# Patient Record
Sex: MALE | Race: WHITE | Employment: OTHER | URBAN - METROPOLITAN AREA
[De-identification: names, ages, dates, MRNs, and addresses within clinical notes are randomized per-mention and may not be internally consistent; named-entity substitution may affect disease eponyms.]

---

## 2020-03-07 ENCOUNTER — HOSPITAL ENCOUNTER (OUTPATIENT)
Dept: ULTRASOUND IMAGING | Facility: HOSPITAL | Age: 50
Discharge: HOME | End: 2020-03-07
Attending: FAMILY MEDICINE
Payer: COMMERCIAL

## 2020-03-07 ENCOUNTER — OFFICE VISIT (OUTPATIENT)
Dept: FAMILY MEDICINE CLINIC | Facility: CLINIC | Age: 50
End: 2020-03-07
Payer: COMMERCIAL

## 2020-03-07 ENCOUNTER — LAB ENCOUNTER (OUTPATIENT)
Dept: LAB | Facility: HOSPITAL | Age: 50
End: 2020-03-07
Attending: FAMILY MEDICINE
Payer: COMMERCIAL

## 2020-03-07 VITALS
HEIGHT: 74.5 IN | WEIGHT: 248 LBS | TEMPERATURE: 97 F | SYSTOLIC BLOOD PRESSURE: 150 MMHG | HEART RATE: 84 BPM | RESPIRATION RATE: 16 BRPM | BODY MASS INDEX: 31.49 KG/M2 | DIASTOLIC BLOOD PRESSURE: 100 MMHG

## 2020-03-07 DIAGNOSIS — I10 ESSENTIAL HYPERTENSION: ICD-10-CM

## 2020-03-07 DIAGNOSIS — R94.6 ABNORMAL FINDING ON EXAMINATION OF THYROID GLAND: ICD-10-CM

## 2020-03-07 DIAGNOSIS — Z12.5 SCREENING FOR MALIGNANT NEOPLASM OF PROSTATE: ICD-10-CM

## 2020-03-07 DIAGNOSIS — R73.9 HYPERGLYCEMIA: ICD-10-CM

## 2020-03-07 DIAGNOSIS — R73.9 HYPERGLYCEMIA: Primary | ICD-10-CM

## 2020-03-07 DIAGNOSIS — Z12.31 ENCOUNTER FOR SCREENING MAMMOGRAM FOR MALIGNANT NEOPLASM OF BREAST: ICD-10-CM

## 2020-03-07 DIAGNOSIS — Z00.00 LABORATORY EXAMINATION ORDERED AS PART OF A ROUTINE GENERAL MEDICAL EXAMINATION: ICD-10-CM

## 2020-03-07 DIAGNOSIS — Z13.89 SCREENING FOR GENITOURINARY CONDITION: ICD-10-CM

## 2020-03-07 DIAGNOSIS — Z00.00 PHYSICAL EXAM, ANNUAL: Primary | ICD-10-CM

## 2020-03-07 LAB
ALBUMIN SERPL-MCNC: 4 G/DL (ref 3.4–5)
ALBUMIN/GLOB SERPL: 1 {RATIO} (ref 1–2)
ALP LIVER SERPL-CCNC: 76 U/L (ref 45–117)
ALT SERPL-CCNC: 57 U/L (ref 16–61)
ANION GAP SERPL CALC-SCNC: 5 MMOL/L (ref 0–18)
AST SERPL-CCNC: 24 U/L (ref 15–37)
BASOPHILS # BLD AUTO: 0.04 X10(3) UL (ref 0–0.2)
BASOPHILS NFR BLD AUTO: 0.6 %
BILIRUB SERPL-MCNC: 0.8 MG/DL (ref 0.1–2)
BILIRUB UR QL STRIP.AUTO: NEGATIVE
BUN BLD-MCNC: 16 MG/DL (ref 7–18)
BUN/CREAT SERPL: 16.2 (ref 10–20)
CALCIUM BLD-MCNC: 8.4 MG/DL (ref 8.5–10.1)
CHLORIDE SERPL-SCNC: 104 MMOL/L (ref 98–112)
CHOLEST SMN-MCNC: 174 MG/DL (ref ?–200)
CLARITY UR REFRACT.AUTO: CLEAR
CO2 SERPL-SCNC: 27 MMOL/L (ref 21–32)
COLOR UR AUTO: YELLOW
COMPLEXED PSA SERPL-MCNC: 2.79 NG/ML (ref ?–4)
CREAT BLD-MCNC: 0.99 MG/DL (ref 0.7–1.3)
DEPRECATED RDW RBC AUTO: 42.6 FL (ref 35.1–46.3)
EOSINOPHIL # BLD AUTO: 0.07 X10(3) UL (ref 0–0.7)
EOSINOPHIL NFR BLD AUTO: 1.1 %
ERYTHROCYTE [DISTWIDTH] IN BLOOD BY AUTOMATED COUNT: 12.3 % (ref 11–15)
EST. AVERAGE GLUCOSE BLD GHB EST-MCNC: 111 MG/DL (ref 68–126)
GLOBULIN PLAS-MCNC: 3.9 G/DL (ref 2.8–4.4)
GLUCOSE BLD-MCNC: 102 MG/DL (ref 70–99)
GLUCOSE UR STRIP.AUTO-MCNC: NEGATIVE MG/DL
HBA1C MFR BLD HPLC: 5.5 % (ref ?–5.7)
HCT VFR BLD AUTO: 46.2 % (ref 39–53)
HDLC SERPL-MCNC: 53 MG/DL (ref 40–59)
HGB BLD-MCNC: 15.9 G/DL (ref 13–17.5)
IMM GRANULOCYTES # BLD AUTO: 0.01 X10(3) UL (ref 0–1)
IMM GRANULOCYTES NFR BLD: 0.2 %
KETONES UR STRIP.AUTO-MCNC: NEGATIVE MG/DL
LDLC SERPL CALC-MCNC: 101 MG/DL (ref ?–100)
LEUKOCYTE ESTERASE UR QL STRIP.AUTO: NEGATIVE
LYMPHOCYTES # BLD AUTO: 2.2 X10(3) UL (ref 1–4)
LYMPHOCYTES NFR BLD AUTO: 34.2 %
M PROTEIN MFR SERPL ELPH: 7.9 G/DL (ref 6.4–8.2)
MCH RBC QN AUTO: 32.1 PG (ref 26–34)
MCHC RBC AUTO-ENTMCNC: 34.4 G/DL (ref 31–37)
MCV RBC AUTO: 93.3 FL (ref 80–100)
MONOCYTES # BLD AUTO: 0.51 X10(3) UL (ref 0.1–1)
MONOCYTES NFR BLD AUTO: 7.9 %
NEUTROPHILS # BLD AUTO: 3.61 X10 (3) UL (ref 1.5–7.7)
NEUTROPHILS # BLD AUTO: 3.61 X10(3) UL (ref 1.5–7.7)
NEUTROPHILS NFR BLD AUTO: 56 %
NITRITE UR QL STRIP.AUTO: NEGATIVE
NONHDLC SERPL-MCNC: 121 MG/DL (ref ?–130)
OSMOLALITY SERPL CALC.SUM OF ELEC: 283 MOSM/KG (ref 275–295)
PATIENT FASTING Y/N/NP: YES
PATIENT FASTING Y/N/NP: YES
PH UR STRIP.AUTO: 6 [PH] (ref 4.5–8)
PLATELET # BLD AUTO: 252 10(3)UL (ref 150–450)
POTASSIUM SERPL-SCNC: 3.7 MMOL/L (ref 3.5–5.1)
PROT UR STRIP.AUTO-MCNC: NEGATIVE MG/DL
RBC # BLD AUTO: 4.95 X10(6)UL (ref 4.3–5.7)
RBC UR QL AUTO: NEGATIVE
SODIUM SERPL-SCNC: 136 MMOL/L (ref 136–145)
SP GR UR STRIP.AUTO: 1.01 (ref 1–1.03)
TRIGL SERPL-MCNC: 102 MG/DL (ref 30–149)
TSI SER-ACNC: 0.52 MIU/ML (ref 0.36–3.74)
UROBILINOGEN UR STRIP.AUTO-MCNC: <2 MG/DL
VLDLC SERPL CALC-MCNC: 20 MG/DL (ref 0–30)
WBC # BLD AUTO: 6.4 X10(3) UL (ref 4–11)

## 2020-03-07 PROCEDURE — 90471 IMMUNIZATION ADMIN: CPT | Performed by: FAMILY MEDICINE

## 2020-03-07 PROCEDURE — 36415 COLL VENOUS BLD VENIPUNCTURE: CPT

## 2020-03-07 PROCEDURE — 90715 TDAP VACCINE 7 YRS/> IM: CPT | Performed by: FAMILY MEDICINE

## 2020-03-07 PROCEDURE — 80061 LIPID PANEL: CPT

## 2020-03-07 PROCEDURE — 80053 COMPREHEN METABOLIC PANEL: CPT

## 2020-03-07 PROCEDURE — 85025 COMPLETE CBC W/AUTO DIFF WBC: CPT

## 2020-03-07 PROCEDURE — 83036 HEMOGLOBIN GLYCOSYLATED A1C: CPT

## 2020-03-07 PROCEDURE — 81003 URINALYSIS AUTO W/O SCOPE: CPT

## 2020-03-07 PROCEDURE — 76536 US EXAM OF HEAD AND NECK: CPT | Performed by: FAMILY MEDICINE

## 2020-03-07 PROCEDURE — 99386 PREV VISIT NEW AGE 40-64: CPT | Performed by: FAMILY MEDICINE

## 2020-03-07 PROCEDURE — 84443 ASSAY THYROID STIM HORMONE: CPT

## 2020-03-07 RX ORDER — LISINOPRIL 10 MG/1
10 TABLET ORAL DAILY
Qty: 30 TABLET | Refills: 1 | Status: SHIPPED | OUTPATIENT
Start: 2020-03-07 | End: 2020-05-03

## 2020-03-07 NOTE — PATIENT INSTRUCTIONS
Start lisinopril 10 mg 1 tablet daily in the morning. Monitor blood pressure at home write it down and bring readings for the next visit. Bring Rapid [employer]maker  blood pressure machine also for the next visit. Do blood work.   Call [phone] to schedule ultrasound

## 2020-03-07 NOTE — PROGRESS NOTES
Herbie Duverney is a 53year old male who presents for a complete physical exam.   HPI:   Pt has no complaints. His blood pressure was elevated today willing to start medication. He is trying fiber. No chest pain no shortness of breath no palpitations. yes Children: 2  Exercise: three times per week.   Diet: watches calories closely     REVIEW OF SYSTEMS:   GENERAL: feels well otherwise  SKIN: denies any unusual skin lesions  EYES:denies blurred vision or double vision  HEENT: denies nasal congestion, sin hypertension  Abnormal finding on examination of thyroid gland    Orders Placed This Encounter      CBC W/DIFF      COMP METABOLIC PANEL      LIPID PANEL      TSH W REFLEX TO FREE T4      UA/M with Culture Reflex      PSA (Screening) [E]      TdaP (Adacel,

## 2020-04-13 ENCOUNTER — PATIENT MESSAGE (OUTPATIENT)
Dept: FAMILY MEDICINE CLINIC | Facility: CLINIC | Age: 50
End: 2020-04-13

## 2020-04-13 ENCOUNTER — VIRTUAL PHONE E/M (OUTPATIENT)
Dept: FAMILY MEDICINE CLINIC | Facility: CLINIC | Age: 50
End: 2020-04-13
Payer: COMMERCIAL

## 2020-04-13 DIAGNOSIS — I10 ESSENTIAL HYPERTENSION: Primary | ICD-10-CM

## 2020-04-13 DIAGNOSIS — E04.1 THYROID NODULE: ICD-10-CM

## 2020-04-13 PROCEDURE — 99213 OFFICE O/P EST LOW 20 MIN: CPT | Performed by: FAMILY MEDICINE

## 2020-04-13 NOTE — TELEPHONE ENCOUNTER
From: Sandeep Corley  To: Darwin Lao MD  Sent: 4/13/2020 10:38 AM CDT  Subject: Other    This is my blood pressure monitoring chart for a month. I did not take any medications yet.

## 2020-04-13 NOTE — PROGRESS NOTES
Virtual/Telephone Check-In    [address] [place] [address] verbally consents to a Virtual/Telephone Check-In service on 04/13/20. Patient understands and accepts financial responsibility for any deductible, co-insurance and/or co-pays associated with this service.  Irasema Winters Patient also advised to follow CDC guidelines for self isolation/social distancing and symptomatic treatment as outlined on CDC Patient Guidelines. Aide Miguel MD  The note was dictated using speech recognition software.   Accuracy and grammar i

## 2020-05-03 RX ORDER — LISINOPRIL 10 MG/1
10 TABLET ORAL DAILY
Qty: 30 TABLET | Refills: 0 | Status: SHIPPED | OUTPATIENT
Start: 2020-05-03 | End: 2020-06-02

## 2020-05-28 ENCOUNTER — PATIENT MESSAGE (OUTPATIENT)
Dept: FAMILY MEDICINE CLINIC | Facility: CLINIC | Age: 50
End: 2020-05-28

## 2020-05-28 NOTE — TELEPHONE ENCOUNTER
From: Ferny Parks  To: Valorie Mac MD  Sent: 5/28/2020 1:39 PM CDT  Subject: Prescription Question    Blood pressure chart

## 2020-06-02 ENCOUNTER — VIRTUAL PHONE E/M (OUTPATIENT)
Dept: FAMILY MEDICINE CLINIC | Facility: CLINIC | Age: 50
End: 2020-06-02
Payer: COMMERCIAL

## 2020-06-02 VITALS — SYSTOLIC BLOOD PRESSURE: 118 MMHG | DIASTOLIC BLOOD PRESSURE: 82 MMHG

## 2020-06-02 DIAGNOSIS — I10 ESSENTIAL HYPERTENSION: Primary | ICD-10-CM

## 2020-06-02 PROCEDURE — 99213 OFFICE O/P EST LOW 20 MIN: CPT | Performed by: FAMILY MEDICINE

## 2020-06-02 RX ORDER — LISINOPRIL 10 MG/1
10 TABLET ORAL DAILY
Qty: 90 TABLET | Refills: 1 | Status: SHIPPED | OUTPATIENT
Start: 2020-06-02 | End: 2020-06-07

## 2020-06-02 NOTE — PATIENT INSTRUCTIONS
Continue lisinopril at current dose. Low-salt diet. Stay active. Call [phone] to schedule blood test for beginning of July. Follow-up in office with your blood pressure machine at the beginning of November.

## 2020-06-02 NOTE — PROGRESS NOTES
Virtual/Telephone Check-In    [address] [place] [address] verbally consents to a Virtual/Telephone Check-In service on 06/02/20. Patient understands and accepts financial responsibility for any deductible, co-insurance and/or co-pays associated with this service.  This Patient also advised to follow CDC guidelines for self isolation/social distancing and symptomatic treatment as outlined on CDC Patient Guidelines. Joce Covington MD  The note was dictated using speech recognition software.   Accuracy and grammar in tr

## 2020-06-07 DIAGNOSIS — I10 ESSENTIAL HYPERTENSION: ICD-10-CM

## 2020-06-08 RX ORDER — LISINOPRIL 10 MG/1
10 TABLET ORAL DAILY
Qty: 90 TABLET | Refills: 1 | Status: SHIPPED | OUTPATIENT
Start: 2020-06-08 | End: 2020-06-09

## 2020-06-09 DIAGNOSIS — I10 ESSENTIAL HYPERTENSION: ICD-10-CM

## 2020-06-09 RX ORDER — LISINOPRIL 10 MG/1
10 TABLET ORAL DAILY
Qty: 90 TABLET | Refills: 0 | Status: SHIPPED | OUTPATIENT
Start: 2020-06-09 | End: 2020-09-05

## 2020-09-05 DIAGNOSIS — I10 ESSENTIAL HYPERTENSION: ICD-10-CM

## 2020-09-06 DIAGNOSIS — I10 ESSENTIAL HYPERTENSION: ICD-10-CM

## 2020-09-08 RX ORDER — LISINOPRIL 10 MG/1
10 TABLET ORAL DAILY
Qty: 90 TABLET | Refills: 0 | Status: SHIPPED | OUTPATIENT
Start: 2020-09-08 | End: 2020-12-07

## 2020-09-09 RX ORDER — LISINOPRIL 10 MG/1
TABLET ORAL
Qty: 90 TABLET | Refills: 0 | OUTPATIENT
Start: 2020-09-09

## 2020-12-17 RX ORDER — LISINOPRIL 10 MG/1
TABLET ORAL
Qty: 90 TABLET | Refills: 0 | Status: SHIPPED | OUTPATIENT
Start: 2020-12-17 | End: 2021-02-15

## 2020-12-19 ENCOUNTER — MED REC SCAN ONLY (OUTPATIENT)
Dept: FAMILY MEDICINE CLINIC | Facility: CLINIC | Age: 50
End: 2020-12-19

## 2021-02-15 ENCOUNTER — OFFICE VISIT (OUTPATIENT)
Dept: FAMILY MEDICINE CLINIC | Facility: CLINIC | Age: 51
End: 2021-02-15
Payer: COMMERCIAL

## 2021-02-15 ENCOUNTER — HOSPITAL ENCOUNTER (OUTPATIENT)
Dept: ULTRASOUND IMAGING | Facility: HOSPITAL | Age: 51
Discharge: HOME | End: 2021-02-15
Attending: FAMILY MEDICINE
Payer: COMMERCIAL

## 2021-02-15 ENCOUNTER — LAB ENCOUNTER (OUTPATIENT)
Dept: LAB | Facility: HOSPITAL | Age: 51
End: 2021-02-15
Attending: FAMILY MEDICINE
Payer: COMMERCIAL

## 2021-02-15 VITALS
HEART RATE: 69 BPM | SYSTOLIC BLOOD PRESSURE: 124 MMHG | DIASTOLIC BLOOD PRESSURE: 76 MMHG | BODY MASS INDEX: 32.63 KG/M2 | HEIGHT: 74.5 IN | TEMPERATURE: 99 F | RESPIRATION RATE: 16 BRPM | OXYGEN SATURATION: 98 % | WEIGHT: 257 LBS

## 2021-02-15 DIAGNOSIS — E04.1 THYROID NODULE: ICD-10-CM

## 2021-02-15 DIAGNOSIS — Z12.5 SCREENING FOR MALIGNANT NEOPLASM OF PROSTATE: ICD-10-CM

## 2021-02-15 DIAGNOSIS — Z00.00 LABORATORY EXAMINATION ORDERED AS PART OF A ROUTINE GENERAL MEDICAL EXAMINATION: ICD-10-CM

## 2021-02-15 DIAGNOSIS — Z13.89 SCREENING FOR GENITOURINARY CONDITION: ICD-10-CM

## 2021-02-15 DIAGNOSIS — I10 ESSENTIAL HYPERTENSION: ICD-10-CM

## 2021-02-15 DIAGNOSIS — I10 ESSENTIAL HYPERTENSION: Primary | ICD-10-CM

## 2021-02-15 LAB
ALBUMIN SERPL-MCNC: 3.9 G/DL (ref 3.4–5)
ALBUMIN/GLOB SERPL: 1.1 {RATIO} (ref 1–2)
ALP LIVER SERPL-CCNC: 72 U/L
ALT SERPL-CCNC: 51 U/L
ANION GAP SERPL CALC-SCNC: 7 MMOL/L (ref 0–18)
AST SERPL-CCNC: 19 U/L (ref 15–37)
BASOPHILS # BLD AUTO: 0.03 X10(3) UL (ref 0–0.2)
BASOPHILS NFR BLD AUTO: 0.5 %
BILIRUB SERPL-MCNC: 0.7 MG/DL (ref 0.1–2)
BILIRUB UR QL STRIP.AUTO: NEGATIVE
BUN BLD-MCNC: 15 MG/DL (ref 7–18)
BUN/CREAT SERPL: 19.2 (ref 10–20)
CALCIUM BLD-MCNC: 8.7 MG/DL (ref 8.5–10.1)
CHLORIDE SERPL-SCNC: 106 MMOL/L (ref 98–112)
CHOLEST SMN-MCNC: 176 MG/DL (ref ?–200)
CLARITY UR REFRACT.AUTO: CLEAR
CO2 SERPL-SCNC: 25 MMOL/L (ref 21–32)
COMPLEXED PSA SERPL-MCNC: 3.42 NG/ML (ref ?–4)
CREAT BLD-MCNC: 0.78 MG/DL
DEPRECATED RDW RBC AUTO: 42.5 FL (ref 35.1–46.3)
EOSINOPHIL # BLD AUTO: 0.1 X10(3) UL (ref 0–0.7)
EOSINOPHIL NFR BLD AUTO: 1.6 %
ERYTHROCYTE [DISTWIDTH] IN BLOOD BY AUTOMATED COUNT: 12.3 % (ref 11–15)
GLOBULIN PLAS-MCNC: 3.6 G/DL (ref 2.8–4.4)
GLUCOSE BLD-MCNC: 98 MG/DL (ref 70–99)
GLUCOSE UR STRIP.AUTO-MCNC: NEGATIVE MG/DL
HCT VFR BLD AUTO: 44.2 %
HDLC SERPL-MCNC: 59 MG/DL (ref 40–59)
HGB BLD-MCNC: 15 G/DL
IMM GRANULOCYTES # BLD AUTO: 0.02 X10(3) UL (ref 0–1)
IMM GRANULOCYTES NFR BLD: 0.3 %
KETONES UR STRIP.AUTO-MCNC: NEGATIVE MG/DL
LDLC SERPL CALC-MCNC: 93 MG/DL (ref ?–100)
LEUKOCYTE ESTERASE UR QL STRIP.AUTO: NEGATIVE
LYMPHOCYTES # BLD AUTO: 2.14 X10(3) UL (ref 1–4)
LYMPHOCYTES NFR BLD AUTO: 33.2 %
M PROTEIN MFR SERPL ELPH: 7.5 G/DL (ref 6.4–8.2)
MCH RBC QN AUTO: 31.9 PG (ref 26–34)
MCHC RBC AUTO-ENTMCNC: 33.9 G/DL (ref 31–37)
MCV RBC AUTO: 94 FL
MONOCYTES # BLD AUTO: 0.49 X10(3) UL (ref 0.1–1)
MONOCYTES NFR BLD AUTO: 7.6 %
NEUTROPHILS # BLD AUTO: 3.66 X10 (3) UL (ref 1.5–7.7)
NEUTROPHILS # BLD AUTO: 3.66 X10(3) UL (ref 1.5–7.7)
NEUTROPHILS NFR BLD AUTO: 56.8 %
NITRITE UR QL STRIP.AUTO: NEGATIVE
NONHDLC SERPL-MCNC: 117 MG/DL (ref ?–130)
OSMOLALITY SERPL CALC.SUM OF ELEC: 287 MOSM/KG (ref 275–295)
PATIENT FASTING Y/N/NP: YES
PATIENT FASTING Y/N/NP: YES
PH UR STRIP.AUTO: 8 [PH] (ref 4.5–8)
PLATELET # BLD AUTO: 214 10(3)UL (ref 150–450)
POTASSIUM SERPL-SCNC: 3.6 MMOL/L (ref 3.5–5.1)
PROT UR STRIP.AUTO-MCNC: NEGATIVE MG/DL
RBC # BLD AUTO: 4.7 X10(6)UL
RBC UR QL AUTO: NEGATIVE
SODIUM SERPL-SCNC: 138 MMOL/L (ref 136–145)
SP GR UR STRIP.AUTO: 1.01 (ref 1–1.03)
TRIGL SERPL-MCNC: 122 MG/DL (ref 30–149)
TSI SER-ACNC: 0.59 MIU/ML (ref 0.36–3.74)
UROBILINOGEN UR STRIP.AUTO-MCNC: <2 MG/DL
VLDLC SERPL CALC-MCNC: 24 MG/DL (ref 0–30)
WBC # BLD AUTO: 6.4 X10(3) UL (ref 4–11)

## 2021-02-15 PROCEDURE — 80061 LIPID PANEL: CPT

## 2021-02-15 PROCEDURE — 36415 COLL VENOUS BLD VENIPUNCTURE: CPT

## 2021-02-15 PROCEDURE — 80053 COMPREHEN METABOLIC PANEL: CPT

## 2021-02-15 PROCEDURE — 3074F SYST BP LT 130 MM HG: CPT | Performed by: FAMILY MEDICINE

## 2021-02-15 PROCEDURE — 81003 URINALYSIS AUTO W/O SCOPE: CPT

## 2021-02-15 PROCEDURE — 84443 ASSAY THYROID STIM HORMONE: CPT

## 2021-02-15 PROCEDURE — 99213 OFFICE O/P EST LOW 20 MIN: CPT | Performed by: FAMILY MEDICINE

## 2021-02-15 PROCEDURE — 85025 COMPLETE CBC W/AUTO DIFF WBC: CPT

## 2021-02-15 PROCEDURE — 3078F DIAST BP <80 MM HG: CPT | Performed by: FAMILY MEDICINE

## 2021-02-15 PROCEDURE — 3008F BODY MASS INDEX DOCD: CPT | Performed by: FAMILY MEDICINE

## 2021-02-15 PROCEDURE — 76536 US EXAM OF HEAD AND NECK: CPT | Performed by: FAMILY MEDICINE

## 2021-02-15 RX ORDER — LISINOPRIL 10 MG/1
10 TABLET ORAL DAILY
Qty: 90 TABLET | Refills: 1 | Status: SHIPPED | OUTPATIENT
Start: 2021-02-15 | End: 2021-03-15

## 2021-02-15 NOTE — PROGRESS NOTES
Eli Jaramillo is a 49year old male. cc hypertension, thyroid nodule  HPI:   Patient is for follow-up of hypertension doing well with lisinopril no side effects. Denies any chest pain no shortness of breath no palpitations. Needs refill.   His blood press encounter diagnosis)  Thyroid nodule  Laboratory examination ordered as part of a routine general medical examination  Screening for malignant neoplasm of prostate  Screening for genitourinary condition    Orders Placed This Encounter      PSA (Screening)

## 2021-02-15 NOTE — PATIENT INSTRUCTIONS
Call [phone] to schedule US thyroid. Healthy diet. Stay active. Call [phone] to schedule fasting blood tests.

## 2021-02-26 ENCOUNTER — LAB ENCOUNTER (OUTPATIENT)
Dept: LAB | Facility: HOSPITAL | Age: 51
End: 2021-02-26
Attending: FAMILY MEDICINE
Payer: COMMERCIAL

## 2021-02-26 ENCOUNTER — TELEPHONE (OUTPATIENT)
Dept: FAMILY MEDICINE CLINIC | Facility: CLINIC | Age: 51
End: 2021-02-26

## 2021-02-26 ENCOUNTER — TELEMEDICINE (OUTPATIENT)
Dept: FAMILY MEDICINE CLINIC | Facility: CLINIC | Age: 51
End: 2021-02-26

## 2021-02-26 DIAGNOSIS — R51.9 NEW ONSET HEADACHE: ICD-10-CM

## 2021-02-26 DIAGNOSIS — Z20.822 SUSPECTED COVID-19 VIRUS INFECTION: ICD-10-CM

## 2021-02-26 DIAGNOSIS — R50.9 FEVER, UNSPECIFIED FEVER CAUSE: ICD-10-CM

## 2021-02-26 DIAGNOSIS — R63.0 DECREASED APPETITE: ICD-10-CM

## 2021-02-26 DIAGNOSIS — R35.0 FREQUENT URINATION: ICD-10-CM

## 2021-02-26 DIAGNOSIS — R35.0 FREQUENT URINATION: Primary | ICD-10-CM

## 2021-02-26 DIAGNOSIS — R68.83 CHILLS: ICD-10-CM

## 2021-02-26 LAB
BILIRUB UR QL STRIP.AUTO: NEGATIVE
CLARITY UR REFRACT.AUTO: CLEAR
COLOR UR AUTO: YELLOW
GLUCOSE UR STRIP.AUTO-MCNC: NEGATIVE MG/DL
LEUKOCYTE ESTERASE UR QL STRIP.AUTO: NEGATIVE
NITRITE UR QL STRIP.AUTO: NEGATIVE
PH UR STRIP.AUTO: 6 [PH] (ref 5–8)
PROT UR STRIP.AUTO-MCNC: NEGATIVE MG/DL
RBC UR QL AUTO: NEGATIVE
SARS-COV-2 RNA RESP QL NAA+PROBE: DETECTED
SP GR UR STRIP.AUTO: 1.01 (ref 1–1.03)
UROBILINOGEN UR STRIP.AUTO-MCNC: <2 MG/DL

## 2021-02-26 PROCEDURE — 87086 URINE CULTURE/COLONY COUNT: CPT

## 2021-02-26 PROCEDURE — 99213 OFFICE O/P EST LOW 20 MIN: CPT | Performed by: FAMILY MEDICINE

## 2021-02-26 PROCEDURE — 81001 URINALYSIS AUTO W/SCOPE: CPT

## 2021-02-26 NOTE — TELEPHONE ENCOUNTER
zaki received urgent page from pt at [facility]. Call to pt-reports he has had sl fever since Monday 2/22 100-101 and slight headache. Denies any other symptoms and any known exposure to Covid+or suspected Covid+ persons.  Requesting Covid testing, if Dr manzano

## 2021-02-26 NOTE — PROGRESS NOTES
Subjective   Fever, body aches, chills, decreased appetite, headache  HPI:   Lord Davis verbally consents to a Virtual/Telephone Check-In service on 02/26/21.  Patient understands and accepts financial responsibility for any deductible, co-insurance and Future    Decreased appetite  -     SARS-COV-2 BY PCR ([facility]); Future  -     URINE CULTURE, ROUTINE; Future    New onset headache  -     SARS-COV-2 BY PCR ([facility]INITY); Future  -     URINE CULTURE, ROUTINE; Future     Proceed with COVID-19 testing.   We will

## 2021-02-26 NOTE — TELEPHONE ENCOUNTER
Call back to pt-advised of dr dumont's comment/recommendation. Pt confirms can do appt 2 1115 as  requests. Advised will transfer him to  to set up appt. Patient voices understanding/agrees with plan/no further questions.

## 2021-02-26 NOTE — TELEPHONE ENCOUNTER
Please schedule patient for the virtual visit we can do it at 1115 and schedule the Covid test if appropriate.

## 2021-02-27 NOTE — TELEPHONE ENCOUNTER
Patient COVID-19 test came back positive on February 26, 2021. His symptoms started on Monday, February 22, 2021. Patient had a fever body aches headache decreased appetite. Recommended supportive care.   Patient is to quarantine until Wednesday, March 3

## 2021-03-01 NOTE — TELEPHONE ENCOUNTER
Continue symptomatic care hydration use Tylenol as needed if fever above 101. Otherwise monitor. Will call patient in 2 days. Patient has to be not having fevers 24 hours before being released from quarantine.

## 2021-03-01 NOTE — TELEPHONE ENCOUNTER
Patient under quarantine. Fevers running 100 at the highest. Stomach upset and bloating in the evenings after eating, loose stools x 1 yesterday-not today yet. O2 saturation 94-96%. Denies cough/SOB/CP.

## 2021-03-03 ENCOUNTER — TELEPHONE (OUTPATIENT)
Dept: FAMILY MEDICINE CLINIC | Facility: CLINIC | Age: 51
End: 2021-03-03

## 2021-03-03 ENCOUNTER — APPOINTMENT (OUTPATIENT)
Dept: CT IMAGING | Facility: HOSPITAL | Age: 51
DRG: 177 | End: 2021-03-03
Attending: EMERGENCY MEDICINE
Payer: COMMERCIAL

## 2021-03-03 ENCOUNTER — HOSPITAL ENCOUNTER (INPATIENT)
Facility: HOSPITAL | Age: 51
LOS: 6 days | Discharge: HOME | DRG: 177 | End: 2021-03-09
Attending: EMERGENCY MEDICINE | Admitting: HOSPITALIST
Payer: COMMERCIAL

## 2021-03-03 ENCOUNTER — APPOINTMENT (OUTPATIENT)
Dept: GENERAL RADIOLOGY | Facility: HOSPITAL | Age: 51
DRG: 177 | End: 2021-03-03
Attending: EMERGENCY MEDICINE
Payer: COMMERCIAL

## 2021-03-03 DIAGNOSIS — E87.1 HYPONATREMIA: ICD-10-CM

## 2021-03-03 DIAGNOSIS — R09.02 HYPOXIA: ICD-10-CM

## 2021-03-03 DIAGNOSIS — J12.82 PNEUMONIA DUE TO COVID-19 VIRUS: Primary | ICD-10-CM

## 2021-03-03 DIAGNOSIS — U07.1 PNEUMONIA DUE TO COVID-19 VIRUS: Primary | ICD-10-CM

## 2021-03-03 LAB
ALBUMIN SERPL-MCNC: 2.9 G/DL (ref 3.4–5)
ALBUMIN/GLOB SERPL: 0.8 {RATIO} (ref 1–2)
ALP LIVER SERPL-CCNC: 66 U/L
ALT SERPL-CCNC: 61 U/L
ANION GAP SERPL CALC-SCNC: 8 MMOL/L (ref 0–18)
AST SERPL-CCNC: 46 U/L (ref 15–37)
ATRIAL RATE: 102 BPM
BASOPHILS # BLD AUTO: 0 X10(3) UL (ref 0–0.2)
BASOPHILS NFR BLD AUTO: 0 %
BILIRUB SERPL-MCNC: 0.6 MG/DL (ref 0.1–2)
BILIRUB UR QL STRIP.AUTO: NEGATIVE
BUN BLD-MCNC: 8 MG/DL (ref 7–18)
BUN/CREAT SERPL: 9.9 (ref 10–20)
CALCIUM BLD-MCNC: 8.3 MG/DL (ref 8.5–10.1)
CHLORIDE SERPL-SCNC: 96 MMOL/L (ref 98–112)
CK SERPL-CCNC: 166 U/L
CLARITY UR REFRACT.AUTO: CLEAR
CO2 SERPL-SCNC: 24 MMOL/L (ref 21–32)
COLOR UR AUTO: YELLOW
CREAT BLD-MCNC: 0.81 MG/DL
CRP SERPL-MCNC: 3.23 MG/DL (ref ?–0.3)
D-DIMER: 1.91 UG/ML FEU (ref ?–0.5)
DEPRECATED HBV CORE AB SER IA-ACNC: 826.4 NG/ML
DEPRECATED RDW RBC AUTO: 39.4 FL (ref 35.1–46.3)
EOSINOPHIL # BLD AUTO: 0 X10(3) UL (ref 0–0.7)
EOSINOPHIL NFR BLD AUTO: 0 %
ERYTHROCYTE [DISTWIDTH] IN BLOOD BY AUTOMATED COUNT: 12 % (ref 11–15)
GLOBULIN PLAS-MCNC: 3.8 G/DL (ref 2.8–4.4)
GLUCOSE BLD-MCNC: 117 MG/DL (ref 70–99)
GLUCOSE UR STRIP.AUTO-MCNC: 150 MG/DL
HCT VFR BLD AUTO: 39 %
HGB BLD-MCNC: 14.2 G/DL
IMM GRANULOCYTES # BLD AUTO: 0.02 X10(3) UL (ref 0–1)
IMM GRANULOCYTES NFR BLD: 0.3 %
KETONES UR STRIP.AUTO-MCNC: NEGATIVE MG/DL
LDH SERPL L TO P-CCNC: 457 U/L
LEUKOCYTE ESTERASE UR QL STRIP.AUTO: NEGATIVE
LYMPHOCYTES # BLD AUTO: 0.88 X10(3) UL (ref 1–4)
LYMPHOCYTES NFR BLD AUTO: 14.4 %
M PROTEIN MFR SERPL ELPH: 6.7 G/DL (ref 6.4–8.2)
MCH RBC QN AUTO: 32.6 PG (ref 26–34)
MCHC RBC AUTO-ENTMCNC: 36.4 G/DL (ref 31–37)
MCV RBC AUTO: 89.7 FL
MONOCYTES # BLD AUTO: 0.38 X10(3) UL (ref 0.1–1)
MONOCYTES NFR BLD AUTO: 6.2 %
NEUTROPHILS # BLD AUTO: 4.82 X10 (3) UL (ref 1.5–7.7)
NEUTROPHILS # BLD AUTO: 4.82 X10(3) UL (ref 1.5–7.7)
NEUTROPHILS NFR BLD AUTO: 79.1 %
NITRITE UR QL STRIP.AUTO: NEGATIVE
NT-PROBNP SERPL-MCNC: 29 PG/ML (ref ?–125)
OSMOLALITY SERPL CALC.SUM OF ELEC: 265 MOSM/KG (ref 275–295)
P AXIS: 41 DEGREES
P-R INTERVAL: 166 MS
PH UR STRIP.AUTO: 6 [PH] (ref 5–8)
PLATELET # BLD AUTO: 163 10(3)UL (ref 150–450)
POTASSIUM SERPL-SCNC: 3.6 MMOL/L (ref 3.5–5.1)
PROCALCITONIN SERPL-MCNC: <0.05 NG/ML (ref ?–0.16)
PROT UR STRIP.AUTO-MCNC: NEGATIVE MG/DL
Q-T INTERVAL: 344 MS
QRS DURATION: 90 MS
QTC CALCULATION (BEZET): 448 MS
R AXIS: 45 DEGREES
RBC # BLD AUTO: 4.35 X10(6)UL
RBC UR QL AUTO: NEGATIVE
SODIUM SERPL-SCNC: 128 MMOL/L (ref 136–145)
SP GR UR STRIP.AUTO: 1.01 (ref 1–1.03)
T AXIS: 29 DEGREES
TROPONIN I SERPL-MCNC: <0.045 NG/ML (ref ?–0.04)
UROBILINOGEN UR STRIP.AUTO-MCNC: <2 MG/DL
VENTRICULAR RATE: 102 BPM
WBC # BLD AUTO: 6.1 X10(3) UL (ref 4–11)

## 2021-03-03 PROCEDURE — 3E0333Z INTRODUCTION OF ANTI-INFLAMMATORY INTO PERIPHERAL VEIN, PERCUTANEOUS APPROACH: ICD-10-PCS | Performed by: INTERNAL MEDICINE

## 2021-03-03 PROCEDURE — 71045 X-RAY EXAM CHEST 1 VIEW: CPT | Performed by: EMERGENCY MEDICINE

## 2021-03-03 PROCEDURE — 71275 CT ANGIOGRAPHY CHEST: CPT | Performed by: EMERGENCY MEDICINE

## 2021-03-03 PROCEDURE — XW033E5 INTRODUCTION OF REMDESIVIR ANTI-INFECTIVE INTO PERIPHERAL VEIN, PERCUTANEOUS APPROACH, NEW TECHNOLOGY GROUP 5: ICD-10-PCS | Performed by: INTERNAL MEDICINE

## 2021-03-03 PROCEDURE — 99223 1ST HOSP IP/OBS HIGH 75: CPT | Performed by: HOSPITALIST

## 2021-03-03 RX ORDER — ONDANSETRON 2 MG/ML
4 INJECTION INTRAMUSCULAR; INTRAVENOUS EVERY 4 HOURS PRN
Status: CANCELLED | OUTPATIENT
Start: 2021-03-03 | End: 2021-03-03

## 2021-03-03 RX ORDER — SODIUM CHLORIDE 9 MG/ML
1000 INJECTION, SOLUTION INTRAVENOUS ONCE
Status: COMPLETED | OUTPATIENT
Start: 2021-03-03 | End: 2021-03-03

## 2021-03-03 RX ORDER — ZINC SULFATE 50(220)MG
220 CAPSULE ORAL 2 TIMES DAILY
Status: DISCONTINUED | OUTPATIENT
Start: 2021-03-03 | End: 2021-03-09

## 2021-03-03 RX ORDER — ALBUTEROL SULFATE 90 UG/1
4 AEROSOL, METERED RESPIRATORY (INHALATION) EVERY 4 HOURS PRN
Status: DISCONTINUED | OUTPATIENT
Start: 2021-03-03 | End: 2021-03-09

## 2021-03-03 RX ORDER — MULTIVIT,TX WITH IRON,MINERALS
1 TABLET, EXTENDED RELEASE ORAL DAILY
Status: DISCONTINUED | OUTPATIENT
Start: 2021-03-03 | End: 2021-03-03

## 2021-03-03 RX ORDER — LISINOPRIL 10 MG/1
10 TABLET ORAL DAILY
Status: DISCONTINUED | OUTPATIENT
Start: 2021-03-03 | End: 2021-03-09

## 2021-03-03 RX ORDER — DEXAMETHASONE SODIUM PHOSPHATE 10 MG/ML
10 INJECTION, SOLUTION INTRAMUSCULAR; INTRAVENOUS ONCE
Status: COMPLETED | OUTPATIENT
Start: 2021-03-03 | End: 2021-03-03

## 2021-03-03 RX ORDER — MULTIVIT-MIN/IRON/FOLIC ACID/K 18-600-40
2000 CAPSULE ORAL DAILY
COMMUNITY

## 2021-03-03 RX ORDER — GUAIFENESIN 600 MG
600 TABLET, EXTENDED RELEASE 12 HR ORAL 2 TIMES DAILY
Status: DISCONTINUED | OUTPATIENT
Start: 2021-03-03 | End: 2021-03-09

## 2021-03-03 RX ORDER — SODIUM CHLORIDE 9 MG/ML
INJECTION, SOLUTION INTRAVENOUS CONTINUOUS
Status: CANCELLED | OUTPATIENT
Start: 2021-03-03 | End: 2021-03-03

## 2021-03-03 RX ORDER — MULTIVIT,TX WITH IRON,MINERALS
1 TABLET, EXTENDED RELEASE ORAL DAILY
COMMUNITY
End: 2021-11-02 | Stop reason: ALTCHOICE

## 2021-03-03 RX ORDER — ASCORBIC ACID 500 MG
1000 TABLET ORAL DAILY
Status: DISCONTINUED | OUTPATIENT
Start: 2021-03-03 | End: 2021-03-09

## 2021-03-03 RX ORDER — ONDANSETRON 2 MG/ML
4 INJECTION INTRAMUSCULAR; INTRAVENOUS EVERY 6 HOURS PRN
Status: DISCONTINUED | OUTPATIENT
Start: 2021-03-03 | End: 2021-03-09

## 2021-03-03 RX ORDER — ACETAMINOPHEN 325 MG/1
650 TABLET ORAL EVERY 6 HOURS PRN
Status: DISCONTINUED | OUTPATIENT
Start: 2021-03-03 | End: 2021-03-09

## 2021-03-03 RX ORDER — ENOXAPARIN SODIUM 100 MG/ML
40 INJECTION SUBCUTANEOUS DAILY
Status: DISCONTINUED | OUTPATIENT
Start: 2021-03-03 | End: 2021-03-09

## 2021-03-03 NOTE — TELEPHONE ENCOUNTER
Update to below. Pt is admitted to hospital currently. Please advise when to outreach to pt next, orig call was to be friday. Thanks.

## 2021-03-03 NOTE — TELEPHONE ENCOUNTER
See 2/26/21 telephone encounter (condition update)-->patient was instructed to call 911 now-->encounter was routed to Dr. Trae Torres as well.

## 2021-03-03 NOTE — ED NOTES
Nurse to Nurse report called to [facility]. Pt pending going to CT Scan and results prior to going to floor. Pt VSS in NAD at this time will continue to monitor.

## 2021-03-03 NOTE — ED NOTES
Pt O2 sat dropped to 88-89% RA. Pt placed on 2 liters NC O2 sat remained 88-89% increased to 3LNC pt O2 sat increased to 95%. Pt denies CP breathing is non-labored, will continue to monitor.

## 2021-03-03 NOTE — ED INITIAL ASSESSMENT (HPI)
Pt presented to ED c/o SOB worsening over past couple day, last night noted O2 sat 89% and dyspnea on exertion.

## 2021-03-03 NOTE — TELEPHONE ENCOUNTER
(see note from PSR below)    I spoke with patient briefly and he verified his SpO2 has ranged from 89-92% this morning    SOB presently and audibly dyspneic    He was immediately instructed to call 911.  Patient verbalized understanding and stated he will c

## 2021-03-03 NOTE — PLAN OF CARE
NURSING ADMISSION NOTE      Patient admitted via Cart to room 527   Oriented to room. Safety precautions initiated. Bed in low position. Call light in reach. Admission Ba completed. Med rec done. MD paged about new orders.  Pt updated on the plan o patient/family/discharge partner  - Complete POLST form as appropriate  - Assess patient's ability to be responsible for managing their own health  - Refer to Case Management Department for coordinating discharge planning if the patient needs post-hospital

## 2021-03-03 NOTE — ED PROVIDER NOTES
Patient Seen in: [facility] Emergency Department      History   Patient presents with:  Covid  Difficulty Breathing    Stated Complaint: covid positive / short of breath    HPI/Subjective:   HPI    Patient is a pleasant 41-year-old male, Covid posit Extraocular muscles are intact. Pupils are equal and reactive to light. Oropharynx is pink and moist.  NECK: Neck is supple and nontender. The trachea is midline. LUNGS: Lungs are clear to auscultation bilaterally, respirations are unlabored.    HEART: Re CBC WITH DIFFERENTIAL WITH PLATELET    Narrative: The following orders were created for panel order CBC WITH DIFFERENTIAL WITH PLATELET.   Procedure                               Abnormality         Status                     --------- laboratory and radiology studies were reviewed and discussed with the patient. IV Decadron was administered. Patient will be admitted for supportive care and ongoing consideration of treatment options.   Patient will be admitted to the service of the Ed

## 2021-03-03 NOTE — TELEPHONE ENCOUNTER
Agree with 911 and ER evaluation.   Please call patient in couple of hours to make sure that he actually went to the hospital.  Thank you

## 2021-03-03 NOTE — H&P
[facility]  History and Physical     Mallorie Matthews Patient Status:  Emergency    1970 MRN [id number]   Location [address] Attending No att. providers found   [facility] Day # 0 PCP Ruperto Cochran MD     Chief Compl Pertinent positives and negatives noted in the HPI. Physical Exam:    /86   Pulse 105   Temp (!) 101.2 °F (38.4 °C) (Temporal)   Resp 18   Ht 6' 3\" (1.905 m)   Wt 245 lb (111.1 kg)   SpO2 97%   BMI 30.62 kg/m²   General: No acute distress.  Kenia n/a     Plan of care discussed with patient.     Rocael Quintero MD  3/3/2021

## 2021-03-03 NOTE — TELEPHONE ENCOUNTER
1. What are your symptoms? POsitive for COVID short of breath today. Fever 101.1. Oxygen this morning was 89% now 92%    2. How long have you been having these symptoms? 3. Have you done anything already to treat your symptoms?          ADDITION

## 2021-03-04 LAB
ALBUMIN SERPL-MCNC: 3 G/DL (ref 3.4–5)
ALBUMIN/GLOB SERPL: 0.8 {RATIO} (ref 1–2)
ALP LIVER SERPL-CCNC: 69 U/L
ALT SERPL-CCNC: 67 U/L
ANION GAP SERPL CALC-SCNC: 9 MMOL/L (ref 0–18)
ANTIBODY SCREEN: NEGATIVE
AST SERPL-CCNC: 48 U/L (ref 15–37)
BASOPHILS # BLD AUTO: 0.01 X10(3) UL (ref 0–0.2)
BASOPHILS NFR BLD AUTO: 0.1 %
BILIRUB SERPL-MCNC: 0.5 MG/DL (ref 0.1–2)
BUN BLD-MCNC: 11 MG/DL (ref 7–18)
BUN/CREAT SERPL: 14.3 (ref 10–20)
CALCIUM BLD-MCNC: 8.6 MG/DL (ref 8.5–10.1)
CHLORIDE SERPL-SCNC: 98 MMOL/L (ref 98–112)
CHOLEST SMN-MCNC: 121 MG/DL (ref ?–200)
CO2 SERPL-SCNC: 26 MMOL/L (ref 21–32)
CREAT BLD-MCNC: 0.77 MG/DL
CRP SERPL-MCNC: 4.25 MG/DL (ref ?–0.3)
D-DIMER: 1.16 UG/ML FEU (ref ?–0.5)
DEPRECATED HBV CORE AB SER IA-ACNC: 970.5 NG/ML
DEPRECATED RDW RBC AUTO: 42.2 FL (ref 35.1–46.3)
EOSINOPHIL # BLD AUTO: 0 X10(3) UL (ref 0–0.7)
EOSINOPHIL NFR BLD AUTO: 0 %
ERYTHROCYTE [DISTWIDTH] IN BLOOD BY AUTOMATED COUNT: 12.3 % (ref 11–15)
EST. AVERAGE GLUCOSE BLD GHB EST-MCNC: 128 MG/DL (ref 68–126)
GLOBULIN PLAS-MCNC: 4 G/DL (ref 2.8–4.4)
GLUCOSE BLD-MCNC: 112 MG/DL (ref 70–99)
HBA1C MFR BLD HPLC: 6.1 % (ref ?–5.7)
HCT VFR BLD AUTO: 42.1 %
HDLC SERPL-MCNC: 59 MG/DL (ref 40–59)
HGB BLD-MCNC: 14.6 G/DL
IMM GRANULOCYTES # BLD AUTO: 0.04 X10(3) UL (ref 0–1)
IMM GRANULOCYTES NFR BLD: 0.4 %
LDH SERPL L TO P-CCNC: 468 U/L
LDLC SERPL CALC-MCNC: 46 MG/DL (ref ?–100)
LYMPHOCYTES # BLD AUTO: 0.9 X10(3) UL (ref 1–4)
LYMPHOCYTES NFR BLD AUTO: 9.9 %
M PROTEIN MFR SERPL ELPH: 7 G/DL (ref 6.4–8.2)
MCH RBC QN AUTO: 32.1 PG (ref 26–34)
MCHC RBC AUTO-ENTMCNC: 34.7 G/DL (ref 31–37)
MCV RBC AUTO: 92.5 FL
MONOCYTES # BLD AUTO: 0.58 X10(3) UL (ref 0.1–1)
MONOCYTES NFR BLD AUTO: 6.4 %
NEUTROPHILS # BLD AUTO: 7.54 X10 (3) UL (ref 1.5–7.7)
NEUTROPHILS # BLD AUTO: 7.54 X10(3) UL (ref 1.5–7.7)
NEUTROPHILS NFR BLD AUTO: 83.2 %
NONHDLC SERPL-MCNC: 62 MG/DL (ref ?–130)
OSMOLALITY SERPL CALC.SUM OF ELEC: 276 MOSM/KG (ref 275–295)
PLATELET # BLD AUTO: 202 10(3)UL (ref 150–450)
POTASSIUM SERPL-SCNC: 3.9 MMOL/L (ref 3.5–5.1)
RBC # BLD AUTO: 4.55 X10(6)UL
RH BLOOD TYPE: POSITIVE
SODIUM SERPL-SCNC: 133 MMOL/L (ref 136–145)
TRIGL SERPL-MCNC: 80 MG/DL (ref 30–149)
VLDLC SERPL CALC-MCNC: 16 MG/DL (ref 0–30)
WBC # BLD AUTO: 9.1 X10(3) UL (ref 4–11)

## 2021-03-04 PROCEDURE — 99232 SBSQ HOSP IP/OBS MODERATE 35: CPT | Performed by: HOSPITALIST

## 2021-03-04 PROCEDURE — 99255 IP/OBS CONSLTJ NEW/EST HI 80: CPT | Performed by: OTHER

## 2021-03-04 RX ORDER — ASPIRIN 325 MG
325 TABLET, DELAYED RELEASE (ENTERIC COATED) ORAL DAILY
Status: DISCONTINUED | OUTPATIENT
Start: 2021-03-04 | End: 2021-03-09

## 2021-03-04 RX ORDER — DEXAMETHASONE SODIUM PHOSPHATE 4 MG/ML
6 VIAL (ML) INJECTION DAILY
Status: DISCONTINUED | OUTPATIENT
Start: 2021-03-04 | End: 2021-03-07

## 2021-03-04 RX ORDER — CALCIUM CARBONATE 200(500)MG
1000 TABLET,CHEWABLE ORAL 3 TIMES DAILY PRN
Status: DISCONTINUED | OUTPATIENT
Start: 2021-03-04 | End: 2021-03-09

## 2021-03-04 NOTE — PLAN OF CARE
COVID-19 Daily Discharge Readiness-Nursing    Ax04. Telemetry NSR.  on 2L NC, 02 sats 92%. diminished lung sounds. Afebrile. Denied pain. Dry cough. Regular diet. Continent. Urinalysis sent to lab. Ambulatory. Saline lock.  First dose of Remdesivir given DISCHARGE PLANNING  Goal: Discharge to home or other facility with appropriate resources  Description: INTERVENTIONS:  - Identify barriers to discharge w/pt and caregiver  - Include patient/family/discharge partner in discharge planning  - Arrange for need

## 2021-03-04 NOTE — COVID NURSING ASSESSMENT
COVID-19 Daily Discharge Readiness-Nursing    Temperature max from last 24 hrs: Temp (24hrs), Av.1 °F (36.7 °C), Min:97.5 °F (36.4 °C), Max:98.6 °F (37 °C)  Patient is A/Ox4, pt reporting diplopia in L eye for a few days- pt states when he looks to the

## 2021-03-04 NOTE — PAYOR COMM NOTE
--------------  ADMISSION REVIEW     Payor: Serafin Tavera [facility] EXCLUSIVE EPO  Subscriber #:  [id number]  Authorization Number: [id number]    Admit date: 3/3/21  Admit time: 1728       Patient Seen in: [facility] Emergency Department    History   Sanya Aleman Reviewed   COMP METABOLIC PANEL (14) - Abnormal; Notable for the following components:       Result Value    Glucose 117 (*)     Sodium 128 (*)     Chloride 96 (*)     BUN/CREA Ratio 9.9 (*)     Calcium, Total 8.3 (*)     Calculated Osmolality 265 (*) hypoxia    History of Present Illness: Benigno Vaughan is a 49year old male with past medical history significant for hypertension presents to ER with complaint of shortness of breath and hypoxia. Patient first started to have symptoms on 2/22.   He was te needed  2. Dyspnea, hypoxia, due to above  3. Obesity, BMI 30  4.  HTN, BP controlled, resume ACE    Quality:  · DVT Prophylaxis: lovenox  · CODE status: Full        MEDICATIONS ADMINISTERED IN LAST 1 DAY:  ascorbic acid (VITAMIN C) tab 1,000 mg     Date Ac

## 2021-03-04 NOTE — CONSULTS
INFECTIOUS DISEASE CONSULT NOTE    Jyothi Pineda Patient Status:  Inpatient    1970 MRN [id number]   [facility] 5NW-A Attending Dipti Woodruff MD   Hosp Day # 1 PCP Obed Kumari MD       Reason for Consultation:  Covid 19 infect Oral, Daily  •  Enoxaparin Sodium (LOVENOX) 40 MG/0.4ML injection 40 mg, 40 mg, Subcutaneous, Daily  •  acetaminophen (TYLENOL) tab 650 mg, 650 mg, Oral, Q6H PRN  •  ondansetron HCl (ZOFRAN) injection 4 mg, 4 mg, Intravenous, Q6H PRN  •  remdesivir 100 mg 03/03/21  1057 03/04/21  0729   * 112*   BUN 8 11   CREATSERUM 0.81 0.77   GFRAA 120 122   GFRNAA 104 106   CA 8.3* 8.6   ALB 2.9* 3.0*   * 133*   K 3.6 3.9   CL 96* 98   CO2 24.0 26.0   ALKPHO 66 69   AST 46* 48*   ALT 61 67*   BILT 0.6 0.5 in the main or lobar pulmonary arteries. THORACIC AORTA:  No aneurysm or visible dissection. LUNGS:  No visible pulmonary disease. MEDIASTINUM:  Marked ground-glass consolidations throughout the lungs, particularly the lower lobes is noted.  RADHA:  Enlarg Essentially stable exam.  There remains a nodule of the right midpole. This does not meet criteria for biopsy based on RANDY guidelines.     Dictated by (CST): David Leyva MD on 2/15/2021 at 11:12 AM     Finalized by (CST): David Leyva MD on 2/15/2021 at 11:17 with you and will make further recommendations based on his progress.     Shauna Pleitez  3/4/2021  11:40 AM

## 2021-03-04 NOTE — PROGRESS NOTES
[facility]  Progress Note     Lord Davis Patient Status:  Inpatient    1970 MRN [id number]   [facility] 5NW-A Attending Ernie Sanches MD   Hosp Day # 1 PCP Maryann Mclean MD     Chief Complaint: SPN    S: Patient doing 220 mg Oral BID   • cholecalciferol  2,000 Units Oral Daily   • ascorbic acid  1,000 mg Oral Daily   • enoxaparin  40 mg Subcutaneous Daily   • remdesivir  100 mg Intravenous Q24H       ASSESSMENT / PLAN:        1. COVID 19 pna  1.  Inflammatory markers olya

## 2021-03-04 NOTE — CONSULTS
[facility] Neurology Note    Solmon Kasey Patient Status:  Inpatient    1970 MRN [id number]   [facility] 5NW-A Attending Yanet Berry MD   Hosp Day # 1 PCP Valorie Mac MD     REASON FOR CONSULTATION:    Double visi Start Date , End Date , Taking? Yes, Authorizing Provider External/Patient, Reported    Medication lisinopril 10 MG Oral Tab, Sig Take 1 tablet (10 mg total) by mouth daily. , Start Date 2/15/21, End Date , Taking?  Yes, Authorizing Provider Joe Montoya full, Pupils equal and reactive, 3 mm brisk  CN III, IV, VI: EOM with intermittent left 6th nerve palsy, no nystagmus  CN V: Facial sensation normal    CN VII: Symmetric facial movement   CN VIII: Normal hearing   CN IX, XI: uvula and palate elevate symmet

## 2021-03-05 ENCOUNTER — APPOINTMENT (OUTPATIENT)
Dept: MRI IMAGING | Facility: HOSPITAL | Age: 51
DRG: 177 | End: 2021-03-05
Attending: NURSE PRACTITIONER
Payer: COMMERCIAL

## 2021-03-05 LAB
ALBUMIN SERPL-MCNC: 2.8 G/DL (ref 3.4–5)
ALBUMIN/GLOB SERPL: 0.7 {RATIO} (ref 1–2)
ALP LIVER SERPL-CCNC: 67 U/L
ALT SERPL-CCNC: 85 U/L
ANION GAP SERPL CALC-SCNC: 8 MMOL/L (ref 0–18)
AST SERPL-CCNC: 62 U/L (ref 15–37)
BASOPHILS # BLD AUTO: 0.01 X10(3) UL (ref 0–0.2)
BASOPHILS NFR BLD AUTO: 0.2 %
BILIRUB SERPL-MCNC: 0.4 MG/DL (ref 0.1–2)
BUN BLD-MCNC: 14 MG/DL (ref 7–18)
BUN/CREAT SERPL: 17.9 (ref 10–20)
CALCIUM BLD-MCNC: 8.4 MG/DL (ref 8.5–10.1)
CHLORIDE SERPL-SCNC: 101 MMOL/L (ref 98–112)
CO2 SERPL-SCNC: 26 MMOL/L (ref 21–32)
CREAT BLD-MCNC: 0.78 MG/DL
CRP SERPL-MCNC: 2.76 MG/DL (ref ?–0.3)
D-DIMER: 1.27 UG/ML FEU (ref ?–0.5)
DEPRECATED HBV CORE AB SER IA-ACNC: 999.6 NG/ML
DEPRECATED RDW RBC AUTO: 41.3 FL (ref 35.1–46.3)
EOSINOPHIL # BLD AUTO: 0 X10(3) UL (ref 0–0.7)
EOSINOPHIL NFR BLD AUTO: 0 %
ERYTHROCYTE [DISTWIDTH] IN BLOOD BY AUTOMATED COUNT: 12.3 % (ref 11–15)
GLOBULIN PLAS-MCNC: 3.9 G/DL (ref 2.8–4.4)
GLUCOSE BLD-MCNC: 114 MG/DL (ref 70–99)
HCT VFR BLD AUTO: 41.3 %
HGB BLD-MCNC: 14 G/DL
IMM GRANULOCYTES # BLD AUTO: 0.05 X10(3) UL (ref 0–1)
IMM GRANULOCYTES NFR BLD: 0.8 %
LDH SERPL L TO P-CCNC: 492 U/L
LYMPHOCYTES # BLD AUTO: 0.98 X10(3) UL (ref 1–4)
LYMPHOCYTES NFR BLD AUTO: 14.8 %
M PROTEIN MFR SERPL ELPH: 6.7 G/DL (ref 6.4–8.2)
MCH RBC QN AUTO: 31.3 PG (ref 26–34)
MCHC RBC AUTO-ENTMCNC: 33.9 G/DL (ref 31–37)
MCV RBC AUTO: 92.2 FL
MONOCYTES # BLD AUTO: 0.44 X10(3) UL (ref 0.1–1)
MONOCYTES NFR BLD AUTO: 6.6 %
NEUTROPHILS # BLD AUTO: 5.16 X10 (3) UL (ref 1.5–7.7)
NEUTROPHILS # BLD AUTO: 5.16 X10(3) UL (ref 1.5–7.7)
NEUTROPHILS NFR BLD AUTO: 77.6 %
OSMOLALITY SERPL CALC.SUM OF ELEC: 281 MOSM/KG (ref 275–295)
PLATELET # BLD AUTO: 256 10(3)UL (ref 150–450)
POTASSIUM SERPL-SCNC: 3.9 MMOL/L (ref 3.5–5.1)
RBC # BLD AUTO: 4.48 X10(6)UL
SODIUM SERPL-SCNC: 135 MMOL/L (ref 136–145)
WBC # BLD AUTO: 6.6 X10(3) UL (ref 4–11)

## 2021-03-05 PROCEDURE — 99233 SBSQ HOSP IP/OBS HIGH 50: CPT | Performed by: OTHER

## 2021-03-05 PROCEDURE — 70547 MR ANGIOGRAPHY NECK W/O DYE: CPT | Performed by: NURSE PRACTITIONER

## 2021-03-05 PROCEDURE — 99232 SBSQ HOSP IP/OBS MODERATE 35: CPT | Performed by: HOSPITALIST

## 2021-03-05 PROCEDURE — 70551 MRI BRAIN STEM W/O DYE: CPT | Performed by: NURSE PRACTITIONER

## 2021-03-05 PROCEDURE — 70544 MR ANGIOGRAPHY HEAD W/O DYE: CPT | Performed by: NURSE PRACTITIONER

## 2021-03-05 NOTE — PROGRESS NOTES
Assumed care of pt at 2130. Pt A/Ox4. Received on 3L, weaned down to RA. SpO2 93%. Receiving decadron and remdesivir. Tele- SR. VS stable. Max temp 99.2, ice packs given. Pt up ad arturo, voids. Continent of bowels and bladder. Has not complained of any pain.

## 2021-03-05 NOTE — PROGRESS NOTES
INFECTIOUS DISEASE TELEVISIT PROGRESS NOTE    Nilam Stephenfany Patient Status:  Inpatient    1970 MRN [id number]   [facility] 5NW-A Attending Mariluz Tam MD   Hosp Day # 2 PCP MD Ronna Borja d#3  Gladys Alston 42.1 41.3   MCV 89.7 92.5 92.2   MCH 32.6 32.1 31.3   MCHC 36.4 34.7 33.9   RDW 12.0 12.3 12.3   NEPRELIM 4.82 7.54 5.16   WBC 6.1 9.1 6.6   .0 202.0 256.0     Recent Labs   Lab 03/03/21  1057 03/04/21  0729 03/05/21  0722   * 112* 114*   BUN resp failure due to above  - wean O2 as able  - prone as tolerated, should also encourage ambulation and IS when not in bed    3. Elevated LFTs/ transaminitis- new, assume due to covid. Monitor while on RDV, no need to stop drug until LFTs 10xnl    4.  Dipl

## 2021-03-05 NOTE — PROGRESS NOTES
[facility]  Progress Note     Phillip Rhodes Patient Status:  Inpatient    1970 MRN [id number]   [facility] 5NW-A Attending Marline Castillo MD   Hosp Day # 2 PCP Tootie Neely MD     Chief Complaint: SPN    S: Patient sat 80 Imaging data reviewed in Epic.     Medications:   • dexamethasone Sodium Phosphate  6 mg Intravenous Daily   • aspirin EC  325 mg Oral Daily   • lisinopril  10 mg Oral Daily   • guaiFENesin ER  600 mg Oral BID   • zinc sulfate  220 mg Oral BID   • cholecalc

## 2021-03-05 NOTE — COVID NURSING ASSESSMENT
COVID-19 Daily Discharge Readiness-Nursing    Temperature max from last 24 hrs: Temp (24hrs), Av.6 °F (37 °C), Min:98.1 °F (36.7 °C), Max:99.2 °F (37.3 °C)  Patient is A/Ox4, continues to report intermittent diplopia of L eye, pupils reactive to light,

## 2021-03-05 NOTE — PROGRESS NOTES
[id number] [facility] Progress Note    Dominick Feliz Patient Status:  Inpatient    1970 MRN [id number]   [facility] 5NW-A Attending Yenny Chavez MD   Hosp Day # 2 PCP Yahaira Schwarz MD       Subjective:  Patria Noel BMI 30.78 kg/m²   GENERAL:  Patient is a 49year old male in no acute distress.   HEENT:  Normocephalic, atraumatic  ABD: Soft, non tender  SKIN: Warm, dry, no rashes    NEUROLOGICAL:   Mental status: Oriented to person, place, and time   Speech: Fluent, no Pulse 75   Temp 98.9 °F (37.2 °C) (Oral)   Resp 18   Ht 75\"   Wt 246 lb 4.1 oz (111.7 kg)   SpO2 90%   BMI 30.78 kg/m²     Patient Vitals for the past 24 hrs:   BP Temp Temp src Pulse Resp SpO2   03/05/21 1402 125/73 98.9 °F (37.2 °C) Oral — 18 —   03/05 flow-limiting stenosis, or focal arterial occlusion. 3. No evidence of occlusion, dissection, or flow-limiting stenosis in the cervical vertebral or carotid arteries. No evidence of hemodynamically significant carotid stenosis by NASCET criteria.   Please Results   Component Value Date    HDL 59 03/04/2021    HDL 59 02/15/2021    HDL 53 03/07/2020     Lab Results   Component Value Date    TRIG 80 03/04/2021    TRIG 122 02/15/2021    TRIG 102 03/07/2020     Lab Results   Component Value Date    LDL 46 03/04/

## 2021-03-06 LAB
ALBUMIN SERPL-MCNC: 2.6 G/DL (ref 3.4–5)
ALBUMIN/GLOB SERPL: 0.7 {RATIO} (ref 1–2)
ALP LIVER SERPL-CCNC: 64 U/L
ALT SERPL-CCNC: 82 U/L
ANION GAP SERPL CALC-SCNC: 6 MMOL/L (ref 0–18)
AST SERPL-CCNC: 45 U/L (ref 15–37)
BASOPHILS # BLD AUTO: 0.01 X10(3) UL (ref 0–0.2)
BASOPHILS NFR BLD AUTO: 0.1 %
BILIRUB SERPL-MCNC: 0.4 MG/DL (ref 0.1–2)
BUN BLD-MCNC: 14 MG/DL (ref 7–18)
BUN/CREAT SERPL: 27.5 (ref 10–20)
CALCIUM BLD-MCNC: 8.3 MG/DL (ref 8.5–10.1)
CHLORIDE SERPL-SCNC: 104 MMOL/L (ref 98–112)
CO2 SERPL-SCNC: 26 MMOL/L (ref 21–32)
CREAT BLD-MCNC: 0.51 MG/DL
CRP SERPL-MCNC: 1.55 MG/DL (ref ?–0.3)
D-DIMER: 0.91 UG/ML FEU (ref ?–0.5)
DEPRECATED HBV CORE AB SER IA-ACNC: 845.7 NG/ML
DEPRECATED RDW RBC AUTO: 41.9 FL (ref 35.1–46.3)
EOSINOPHIL # BLD AUTO: 0 X10(3) UL (ref 0–0.7)
EOSINOPHIL NFR BLD AUTO: 0 %
ERYTHROCYTE [DISTWIDTH] IN BLOOD BY AUTOMATED COUNT: 12.4 % (ref 11–15)
GLOBULIN PLAS-MCNC: 3.7 G/DL (ref 2.8–4.4)
GLUCOSE BLD-MCNC: 108 MG/DL (ref 70–99)
HCT VFR BLD AUTO: 40.7 %
HGB BLD-MCNC: 13.8 G/DL
IMM GRANULOCYTES # BLD AUTO: 0.05 X10(3) UL (ref 0–1)
IMM GRANULOCYTES NFR BLD: 0.7 %
LDH SERPL L TO P-CCNC: 431 U/L
LYMPHOCYTES # BLD AUTO: 1.06 X10(3) UL (ref 1–4)
LYMPHOCYTES NFR BLD AUTO: 14.2 %
M PROTEIN MFR SERPL ELPH: 6.3 G/DL (ref 6.4–8.2)
MCH RBC QN AUTO: 31.3 PG (ref 26–34)
MCHC RBC AUTO-ENTMCNC: 33.9 G/DL (ref 31–37)
MCV RBC AUTO: 92.3 FL
MONOCYTES # BLD AUTO: 0.59 X10(3) UL (ref 0.1–1)
MONOCYTES NFR BLD AUTO: 7.9 %
NEUTROPHILS # BLD AUTO: 5.74 X10 (3) UL (ref 1.5–7.7)
NEUTROPHILS # BLD AUTO: 5.74 X10(3) UL (ref 1.5–7.7)
NEUTROPHILS NFR BLD AUTO: 77.1 %
OSMOLALITY SERPL CALC.SUM OF ELEC: 283 MOSM/KG (ref 275–295)
PLATELET # BLD AUTO: 274 10(3)UL (ref 150–450)
POTASSIUM SERPL-SCNC: 3.9 MMOL/L (ref 3.5–5.1)
RBC # BLD AUTO: 4.41 X10(6)UL
SODIUM SERPL-SCNC: 136 MMOL/L (ref 136–145)
WBC # BLD AUTO: 7.5 X10(3) UL (ref 4–11)

## 2021-03-06 PROCEDURE — 99232 SBSQ HOSP IP/OBS MODERATE 35: CPT | Performed by: HOSPITALIST

## 2021-03-06 NOTE — PROGRESS NOTES
[facility]  Progress Note     [facility] Patient Status:  Inpatient    1970 MRN [id number]   [facility] 5NW-A Attending Nay Clayton MD   Hosp Day # 3 PCP Jalen Brooks MD     Chief Complaint: SPN    S: Patient sat 80 data reviewed in Epic.     Medications:   • dexamethasone Sodium Phosphate  6 mg Intravenous Daily   • aspirin EC  325 mg Oral Daily   • lisinopril  10 mg Oral Daily   • guaiFENesin ER  600 mg Oral BID   • zinc sulfate  220 mg Oral BID   • cholecalciferol

## 2021-03-06 NOTE — COVID NURSING ASSESSMENT
COVID-19 Daily Discharge Readiness-Nursing    O2 Sat at Rest:  93% on 2L  Temperature max from last 24 hrs: Temp (24hrs), Av.7 °F (37.1 °C), Min:98.3 °F (36.8 °C), Max:98.9 °F (37.2 °C)    Inflammatory Markers:   Recent Labs   Lab 21  0729 /

## 2021-03-06 NOTE — COVID NURSING ASSESSMENT
COVID-19 Daily Discharge Readiness-Nursing    O2 Sat at Rest:  94%  On 3 liters of oxygen per nasal cannula   O2 Sat with Exertion: 91% on 3 liters of oxygen per nasal cannula  Temperature max from last 24 hrs: Temp (24hrs), Av.5 °F (36.9 °C), Min:97. 6

## 2021-03-06 NOTE — PLAN OF CARE
Problem: Patient/Family Goals  Goal: Patient/Family Long Term Goal  Description: Patient's Long Term Goal:  Discharge to home     Interventions:  - Follow plan of care   - See additional Care Plan goals for specific interventions  Outcome: Josh Valle

## 2021-03-06 NOTE — PROGRESS NOTES
INFECTIOUS DISEASE TELEVISIT PROGRESS NOTE    Tato Duverney Patient Status:  Inpatient    1970 MRN [id number]   [facility] 5NW-A Attending Yolie Aguila MD   Hosp Day # 3 PCP MD Ronna Bill#4  Gladys Agustin 92.3   MCH 32.1 31.3 31.3   MCHC 34.7 33.9 33.9   RDW 12.3 12.3 12.4   NEPRELIM 7.54 5.16 5.74   WBC 9.1 6.6 7.5   .0 256.0 274.0     Recent Labs   Lab 03/04/21  0729 03/05/21  0722 03/06/21  0603   * 114* 108*   BUN 11 14 14   CREATSERUM 0.7 due to above  - wean O2 as able  - prone as tolerated, should also encourage ambulation and IS when not in bed    3. Elevated LFTs/ transaminitis- new, assume due to covid. Monitor while on RDV, no need to stop drug until LFTs 10xnl    4.  Diplopia- no stro

## 2021-03-07 LAB
ALBUMIN SERPL-MCNC: 2.7 G/DL (ref 3.4–5)
ALBUMIN/GLOB SERPL: 0.7 {RATIO} (ref 1–2)
ALP LIVER SERPL-CCNC: 67 U/L
ALT SERPL-CCNC: 127 U/L
ANION GAP SERPL CALC-SCNC: 5 MMOL/L (ref 0–18)
AST SERPL-CCNC: 69 U/L (ref 15–37)
BILIRUB SERPL-MCNC: 0.5 MG/DL (ref 0.1–2)
BUN BLD-MCNC: 15 MG/DL (ref 7–18)
BUN/CREAT SERPL: 29.4 (ref 10–20)
CALCIUM BLD-MCNC: 8.7 MG/DL (ref 8.5–10.1)
CHLORIDE SERPL-SCNC: 105 MMOL/L (ref 98–112)
CO2 SERPL-SCNC: 25 MMOL/L (ref 21–32)
CREAT BLD-MCNC: 0.51 MG/DL
GLOBULIN PLAS-MCNC: 3.9 G/DL (ref 2.8–4.4)
GLUCOSE BLD-MCNC: 91 MG/DL (ref 70–99)
M PROTEIN MFR SERPL ELPH: 6.6 G/DL (ref 6.4–8.2)
OSMOLALITY SERPL CALC.SUM OF ELEC: 280 MOSM/KG (ref 275–295)
POTASSIUM SERPL-SCNC: 3.8 MMOL/L (ref 3.5–5.1)
SODIUM SERPL-SCNC: 135 MMOL/L (ref 136–145)

## 2021-03-07 PROCEDURE — 99232 SBSQ HOSP IP/OBS MODERATE 35: CPT | Performed by: HOSPITALIST

## 2021-03-07 NOTE — PLAN OF CARE
Problem: Patient/Family Goals  Goal: Patient/Family Long Term Goal  Description: Patient's Long Term Goal:  Discharge to home     Interventions:  - Follow plan of care   - See additional Care Plan goals for specific interventions  Outcome: David Nuñez

## 2021-03-07 NOTE — PROGRESS NOTES
[facility]  Progress Note     Veronica Gallegos Patient Status:  Inpatient    1970 MRN [id number]   [facility] 5NW-A Attending Betina Lennon MD   Hosp Day # 4 PCP Camilla Alfaro MD     Chief Complaint: SPN    S: Patient sat 80 Imaging: Imaging data reviewed in Epic.     Medications:   • dexamethasone Sodium Phosphate  6 mg Intravenous Daily   • aspirin EC  325 mg Oral Daily   • lisinopril  10 mg Oral Daily   • guaiFENesin ER  600 mg Oral BID   • zinc sulfate  220 mg Oral BID

## 2021-03-07 NOTE — COVID NURSING ASSESSMENT
COVID-19 Daily Discharge Readiness-Nursing    O2 Sat at Rest:  93% on 3L  O2 Sat with Exertion: pt has not been up and walking around his room due to him feeling uncomfortable. Will take on short walk around room this afternoon.    Temperature max from last

## 2021-03-07 NOTE — PROGRESS NOTES
[facility] Note: Route Optimization for Dexamethasone (Decadron)    Patient is currently on Dexamethasone (Decadron) 6 mg IV every 24 hours.    The patient meets the criteria to convert to the oral equivalent as established by the IV to Oral conversion pr

## 2021-03-07 NOTE — COVID NURSING ASSESSMENT
COVID-19 Daily Discharge Readiness-Nursing    O2 Sat at Rest: 94% on 3L o2  O2 Sat with Exertion: 90% in 3L o2   Temperature max from last 24 hrs: Temp (24hrs), Av.5 °F (36.9 °C), Min:97.6 °F (36.4 °C), Max:99.6 °F (37.6 °C)    Inflammatory Markers:

## 2021-03-08 LAB
ALBUMIN SERPL-MCNC: 2.8 G/DL (ref 3.4–5)
ALBUMIN/GLOB SERPL: 0.8 {RATIO} (ref 1–2)
ALP LIVER SERPL-CCNC: 69 U/L
ALT SERPL-CCNC: 157 U/L
ANION GAP SERPL CALC-SCNC: 5 MMOL/L (ref 0–18)
AST SERPL-CCNC: 70 U/L (ref 15–37)
BILIRUB SERPL-MCNC: 0.5 MG/DL (ref 0.1–2)
BUN BLD-MCNC: 15 MG/DL (ref 7–18)
BUN/CREAT SERPL: 25.4 (ref 10–20)
CALCIUM BLD-MCNC: 8.6 MG/DL (ref 8.5–10.1)
CHLORIDE SERPL-SCNC: 103 MMOL/L (ref 98–112)
CO2 SERPL-SCNC: 28 MMOL/L (ref 21–32)
CREAT BLD-MCNC: 0.59 MG/DL
GLOBULIN PLAS-MCNC: 3.7 G/DL (ref 2.8–4.4)
GLUCOSE BLD-MCNC: 94 MG/DL (ref 70–99)
M PROTEIN MFR SERPL ELPH: 6.5 G/DL (ref 6.4–8.2)
OSMOLALITY SERPL CALC.SUM OF ELEC: 283 MOSM/KG (ref 275–295)
POTASSIUM SERPL-SCNC: 4.3 MMOL/L (ref 3.5–5.1)
SODIUM SERPL-SCNC: 136 MMOL/L (ref 136–145)

## 2021-03-08 PROCEDURE — 99232 SBSQ HOSP IP/OBS MODERATE 35: CPT | Performed by: HOSPITALIST

## 2021-03-08 RX ORDER — DEXAMETHASONE 4 MG/1
4 TABLET ORAL
Qty: 3 TABLET | Refills: 0 | Status: SHIPPED | OUTPATIENT
Start: 2021-03-09 | End: 2021-03-12

## 2021-03-08 NOTE — PLAN OF CARE
Problem: Patient/Family Goals  Goal: Patient/Family Long Term Goal  Description: Patient's Long Term Goal:  Discharge to home     Interventions:  - Follow plan of care   - See additional Care Plan goals for specific interventions  Outcome: Maryanne Lino

## 2021-03-08 NOTE — PAYOR COMM NOTE
--------------  CONTINUED STAY REVIEW    Payor: Esau Grant [facility] EXCLUSIVE EPO  Subscriber #:  [id number]  Authorization Number: [id number]    Admit date: 3/3/21  Admit time:  4:41 PM    FAXING CLINICAL UPDATE FOR 3/8/21    3/8/21   Chief Complaint: SPN wean O2 as able, on 3 liters  5. Obesity, BMI 30  6. HTN, BP controlled, resume ACE  7. Transaminitis, due to above, increasing  8.  Hyponatremia, resolved     Plan of care: cont Remdesivir, steroids     Quality:  · DVT Prophylaxis: lovenox  · CODE status:

## 2021-03-08 NOTE — PROGRESS NOTES
03/08/21 1340   Mobility   O2 walk?  Yes   SPO2% on Room Air at Rest 95   SPO2% Ambulation on Room Air 91

## 2021-03-08 NOTE — PROGRESS NOTES
03/08/21 1216   Mobility   O2 walk?  Yes   SPO2% on Room Air at Rest 91   SPO2% on Oxygen at Rest 91   At rest oxygen flow (liters per minute) 0   SPO2% Ambulation on Room Air 91   SPO2% Ambulation on Oxygen 91   Ambulation oxygen flow (liters per minute

## 2021-03-08 NOTE — COVID NURSING ASSESSMENT
COVID-19 Daily Discharge Readiness-Nursing    O2 Sat at Rest:  SPO2% on Room Air at Rest: 95  %   O2 Sat with Exertion: SPO2% Ambulation on Oxygen: 91  % on Ambulation oxygen flow (liters per minute): 0  liters   Temperature max from last 24 hrs: Temp (24h

## 2021-03-08 NOTE — COVID NURSING ASSESSMENT
Nicholaszaid Schusters   COVID-19 Daily Discharge Readiness-Nursing    O2 Sat at Rest:  93% on 3L  O2 Sat with Exertion: pt with walking to bathroom, feeling too short of breath   Temperature max from last 24 hrs: Temp (24hrs), Av.6 °F (37 °C), Min:98.4 °F (36.9 °C), Max:99

## 2021-03-08 NOTE — PROGRESS NOTES
INFECTIOUS DISEASE TELEVISIT PROGRESS NOTE    Rissa Sánchez Patient Status:  Inpatient    1970 MRN [id number]   [facility] 5NW-A Attending Sandra Ambrosio MD   Hosp Day # 5 PCP MD Yousif Bobby 12.3 12.4   NEPRELIM 7.54 5.16 5.74   WBC 9.1 6.6 7.5   .0 256.0 274.0     Recent Labs   Lab 03/06/21  0603 03/07/21  0643 03/08/21  0531   * 91 94   BUN 14 15 15   CREATSERUM 0.51* 0.51* 0.59*   GFRAA 145 145 136   GFRNAA 125 125 118   CA 8. as tolerated, should also encourage ambulation and IS when not in bed    3. Elevated LFTs/ transaminitis- new, assume due to covid. Monitor while on RDV, no need to stop drug until LFTs 10xnl    4.  Diplopia- no stroke per MRI, felt to be due to partial 6th

## 2021-03-08 NOTE — PROGRESS NOTES
[facility]  Progress Note     Vargas Gill Patient Status:  Inpatient    1970 MRN [id number]   [facility] 5NW-A Attending David Mcmanus MD   Hosp Day # 5 PCP Yaneth Haywood MD     Chief Complaint: SPN    S: Patient proned <0.045               Imaging: Imaging data reviewed in Epic.     Medications:   • dexamethasone  6 mg Oral Daily with breakfast   • aspirin EC  325 mg Oral Daily   • lisinopril  10 mg Oral Daily   • guaiFENesin ER  600 mg Oral BID   • zinc sulfate  22

## 2021-03-09 ENCOUNTER — MED REC SCAN ONLY (OUTPATIENT)
Dept: FAMILY MEDICINE CLINIC | Facility: CLINIC | Age: 51
End: 2021-03-09

## 2021-03-09 VITALS
TEMPERATURE: 99 F | BODY MASS INDEX: 30.3 KG/M2 | OXYGEN SATURATION: 94 % | HEART RATE: 79 BPM | HEIGHT: 75 IN | SYSTOLIC BLOOD PRESSURE: 120 MMHG | DIASTOLIC BLOOD PRESSURE: 71 MMHG | RESPIRATION RATE: 18 BRPM | WEIGHT: 243.69 LBS

## 2021-03-09 LAB
CRP SERPL-MCNC: 0.7 MG/DL (ref ?–0.3)
DEPRECATED HBV CORE AB SER IA-ACNC: 710.6 NG/ML
LDH SERPL L TO P-CCNC: 364 U/L

## 2021-03-09 PROCEDURE — 99239 HOSP IP/OBS DSCHRG MGMT >30: CPT | Performed by: HOSPITALIST

## 2021-03-09 RX ORDER — ALBUTEROL SULFATE 90 UG/1
4 AEROSOL, METERED RESPIRATORY (INHALATION) EVERY 4 HOURS PRN
Qty: 1 INHALER | Refills: 0 | Status: SHIPPED | OUTPATIENT
Start: 2021-03-09 | End: 2021-11-02 | Stop reason: ALTCHOICE

## 2021-03-09 NOTE — PROGRESS NOTES
INFECTIOUS DISEASE TELEVISIT PROGRESS NOTE    Coleman Baugh Patient Status:  Inpatient    1970 MRN [id number]   [facility] 5NW-A Attending Rolo Bobo MD   Hosp Day # 6 PCP Yina Pisano MD     Sp Remmary Pitts 12.4   NEPRELIM 7.54 5.16 5.74   WBC 9.1 6.6 7.5   .0 256.0 274.0     Recent Labs   Lab 03/06/21  0603 03/07/21  0643 03/08/21  0531   * 91 94   BUN 14 15 15   CREATSERUM 0.51* 0.51* 0.59*   GFRAA 145 145 136   GFRNAA 125 125 118   CA 8.3* 8. 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 Seems to have resolved now, instructed pt to f/u with neuro if issue persists    Ok for dc. D/w RN and with pt over the phone.    Will give dexa for 3 more days on Yashira Fox MD

## 2021-03-09 NOTE — DISCHARGE PLANNING
NURSING DISCHARGE NOTE    Discharged Home via Wheelchair. Accompanied by Family member and Support staff  Belongings Returned to patient from safe. PIV dc'd.  Prescriptions and AVS given to patient, discussed f/u orders and prescriptions- verbalized u

## 2021-03-09 NOTE — PROGRESS NOTES
[facility]ITAL[facility]  Progress Note     Rafia Ortiz Patient Status:  Inpatient    1970 MRN [id number]   [facility] 5NW-A Attending Darrell Potts MD   Hosp Day # 6 PCP Esperanza Benz MD     Chief Complaint: SPN    S: Patient feels 89            Imaging: Imaging data reviewed in Epic.     Medications:   • dexamethasone  6 mg Oral Daily with breakfast   • aspirin EC  325 mg Oral Daily   • lisinopril  10 mg Oral Daily   • guaiFENesin ER  600 mg Oral BID   • zinc sulfate  220 mg Oral BI

## 2021-03-09 NOTE — PROGRESS NOTES
COVID-19 Daily Discharge Readiness-Nursing     O2 Sat at Rest:  SPO2% on Room Air at Rest: 93  %   O2 Sat with Exertion: SPO2% Ambulation on Oxygen: 89  % on Ambulation oxygen flow (liters per minute): 0  liters   Temperature max from last 24 hrs: Temp (24

## 2021-03-10 ENCOUNTER — PATIENT OUTREACH (OUTPATIENT)
Dept: CASE MANAGEMENT | Age: 51
End: 2021-03-10

## 2021-03-10 DIAGNOSIS — Z02.9 ENCOUNTERS FOR ADMINISTRATIVE PURPOSE: ICD-10-CM

## 2021-03-10 NOTE — PROGRESS NOTES
Initial Post Discharge Follow Up   Discharge Date: 3/9/21  Contact Date: 3/10/2021    Consent Verification:  Assessment Completed With: Patient  HIPAA Verified?   Yes    Discharge Dx:  Pneumonia due to COVID-19 virus    Was TCC ordered: no        General changes discussed with you prior to leaving the hospital? yes  • (NCM) If a new medication was prescribed:    o Was the new medication’s purpose & side effects reviewed? yes  o Do you have any questions about your new medication?  No  • Did you  your emergency, please dial 911 immediately.           Apr 12, 2021  3:00 PM CDT [facility]            [address] [facility]  [facility] [place]  [address] [facility] [address]  5 denied having any questions or concerns at this time. CCM referral placed:  No        [x]  Discharge Summary, Discharge medications reviewed/discussed/and reconciled against outpatient medications with patient,  and orders reviewed and discussed.  Any c

## 2021-03-11 ENCOUNTER — TELEPHONE (OUTPATIENT)
Dept: FAMILY MEDICINE CLINIC | Facility: CLINIC | Age: 51
End: 2021-03-11

## 2021-03-11 RX ORDER — LISINOPRIL 10 MG/1
TABLET ORAL
Qty: 90 TABLET | Refills: 1 | OUTPATIENT
Start: 2021-03-11

## 2021-03-11 NOTE — TELEPHONE ENCOUNTER
Patient was admitted to the hospital with COVID-19. Please give him a call and see how he is doing. We have a TCM on Monday but we would like to have update before weekend.  Thanks

## 2021-03-12 NOTE — TELEPHONE ENCOUNTER
Thank you for update. I will see him for virtual visit on Monday as scheduled and go from there. Continue supportive care for now keep good hydration.

## 2021-03-12 NOTE — TELEPHONE ENCOUNTER
Update: patient feeling good. Denies fever, cough, SOB or CP. His O2 saturation 95-97%. Today is last day on steroids. He said the only different thing is more frequent trips to urinate, small amounts. He is drinking more water though.

## 2021-03-15 ENCOUNTER — TELEMEDICINE (OUTPATIENT)
Dept: FAMILY MEDICINE CLINIC | Facility: CLINIC | Age: 51
End: 2021-03-15
Payer: COMMERCIAL

## 2021-03-15 DIAGNOSIS — E66.9 OBESITY (BMI 30.0-34.9): ICD-10-CM

## 2021-03-15 DIAGNOSIS — E87.1 HYPONATREMIA: ICD-10-CM

## 2021-03-15 DIAGNOSIS — R09.02 HYPOXIA: ICD-10-CM

## 2021-03-15 DIAGNOSIS — I10 ESSENTIAL HYPERTENSION: ICD-10-CM

## 2021-03-15 DIAGNOSIS — H53.2 DIPLOPIA: ICD-10-CM

## 2021-03-15 DIAGNOSIS — R79.89 ELEVATED LIVER FUNCTION TESTS: ICD-10-CM

## 2021-03-15 DIAGNOSIS — U07.1 PNEUMONIA DUE TO COVID-19 VIRUS: Primary | ICD-10-CM

## 2021-03-15 DIAGNOSIS — J12.82 PNEUMONIA DUE TO COVID-19 VIRUS: Primary | ICD-10-CM

## 2021-03-15 DIAGNOSIS — R79.82 CRP ELEVATED: ICD-10-CM

## 2021-03-15 PROCEDURE — 99495 TRANSJ CARE MGMT MOD F2F 14D: CPT | Performed by: FAMILY MEDICINE

## 2021-03-15 RX ORDER — LISINOPRIL 10 MG/1
10 TABLET ORAL DAILY
Qty: 90 TABLET | Refills: 1 | Status: SHIPPED | OUTPATIENT
Start: 2021-03-15 | End: 2021-12-14

## 2021-03-15 NOTE — PATIENT INSTRUCTIONS
Continue current meds. Healthy diet. Stay active. Call [phone] to schedule blood work at the end of this week. Schedule chest x-ray mid April 2021. Monitor your symptoms at any point worsening symptoms call office.   If having worsening [place]

## 2021-03-17 NOTE — DISCHARGE SUMMARY
[facility]OSPITAL[facility]  DISCHARGE SUMMARY     Phillip Rhodes Patient Status:  Inpatient    1970 MRN [id number]   [facility]A Attending No att. providers found   [facility] Day # 6 PCP Tootie Neely MD     Date of Admission: 3/3/2021  Da as needed for Wheezing or Shortness of Breath. Quantity: 1 Inhaler  Refills: 0     aspirin 325 MG Tbec      Take 1 tablet (325 mg total) by mouth daily for 14 days.    Stop taking on: March 24, 2021  Quantity: 14 tablet  Refills: 0        CONTINUE taking nontender, nondistended. Positive bowel sounds. No rebound or guarding. Neurologic: No focal neurological deficits. Musculoskeletal: Moves all extremities. Extremities: No edema.   -----------------------------------------------------------------------

## 2021-03-19 ENCOUNTER — HOSPITAL ENCOUNTER (OUTPATIENT)
Dept: GENERAL RADIOLOGY | Facility: HOSPITAL | Age: 51
Discharge: HOME | End: 2021-03-19
Attending: FAMILY MEDICINE
Payer: COMMERCIAL

## 2021-03-19 ENCOUNTER — LAB ENCOUNTER (OUTPATIENT)
Dept: LAB | Facility: HOSPITAL | Age: 51
End: 2021-03-19
Attending: FAMILY MEDICINE
Payer: COMMERCIAL

## 2021-03-19 DIAGNOSIS — U07.1 PNEUMONIA DUE TO COVID-19 VIRUS: Primary | ICD-10-CM

## 2021-03-19 DIAGNOSIS — J12.82 PNEUMONIA DUE TO COVID-19 VIRUS: ICD-10-CM

## 2021-03-19 DIAGNOSIS — J12.82 PNEUMONIA DUE TO COVID-19 VIRUS: Primary | ICD-10-CM

## 2021-03-19 DIAGNOSIS — U07.1 PNEUMONIA DUE TO COVID-19 VIRUS: ICD-10-CM

## 2021-03-19 DIAGNOSIS — R79.89 ELEVATED LIVER FUNCTION TESTS: ICD-10-CM

## 2021-03-19 DIAGNOSIS — U07.1 PNEUMONIA DUE TO 2019-NCOV: ICD-10-CM

## 2021-03-19 DIAGNOSIS — R79.82 CRP ELEVATED: ICD-10-CM

## 2021-03-19 DIAGNOSIS — J12.82 PNEUMONIA DUE TO 2019-NCOV: ICD-10-CM

## 2021-03-19 LAB
ALBUMIN SERPL-MCNC: 3.4 G/DL (ref 3.4–5)
ALBUMIN/GLOB SERPL: 0.9 {RATIO} (ref 1–2)
ALP LIVER SERPL-CCNC: 65 U/L
ALT SERPL-CCNC: 85 U/L
ANION GAP SERPL CALC-SCNC: 5 MMOL/L (ref 0–18)
AST SERPL-CCNC: 27 U/L (ref 15–37)
BASOPHILS # BLD AUTO: 0.05 X10(3) UL (ref 0–0.2)
BASOPHILS NFR BLD AUTO: 0.8 %
BILIRUB SERPL-MCNC: 0.6 MG/DL (ref 0.1–2)
BUN BLD-MCNC: 10 MG/DL (ref 7–18)
BUN/CREAT SERPL: 11.4 (ref 10–20)
CALCIUM BLD-MCNC: 8.9 MG/DL (ref 8.5–10.1)
CHLORIDE SERPL-SCNC: 107 MMOL/L (ref 98–112)
CO2 SERPL-SCNC: 27 MMOL/L (ref 21–32)
CREAT BLD-MCNC: 0.88 MG/DL
CRP SERPL-MCNC: <0.29 MG/DL (ref ?–0.3)
DEPRECATED HBV CORE AB SER IA-ACNC: 397.8 NG/ML
DEPRECATED RDW RBC AUTO: 44.4 FL (ref 35.1–46.3)
EOSINOPHIL # BLD AUTO: 0.12 X10(3) UL (ref 0–0.7)
EOSINOPHIL NFR BLD AUTO: 1.9 %
ERYTHROCYTE [DISTWIDTH] IN BLOOD BY AUTOMATED COUNT: 12.9 % (ref 11–15)
GLOBULIN PLAS-MCNC: 3.8 G/DL (ref 2.8–4.4)
GLUCOSE BLD-MCNC: 96 MG/DL (ref 70–99)
HCT VFR BLD AUTO: 41.8 %
HGB BLD-MCNC: 14.1 G/DL
IMM GRANULOCYTES # BLD AUTO: 0.02 X10(3) UL (ref 0–1)
IMM GRANULOCYTES NFR BLD: 0.3 %
LDH SERPL L TO P-CCNC: 248 U/L
LYMPHOCYTES # BLD AUTO: 1.82 X10(3) UL (ref 1–4)
LYMPHOCYTES NFR BLD AUTO: 29 %
M PROTEIN MFR SERPL ELPH: 7.2 G/DL (ref 6.4–8.2)
MCH RBC QN AUTO: 31.8 PG (ref 26–34)
MCHC RBC AUTO-ENTMCNC: 33.7 G/DL (ref 31–37)
MCV RBC AUTO: 94.4 FL
MONOCYTES # BLD AUTO: 0.81 X10(3) UL (ref 0.1–1)
MONOCYTES NFR BLD AUTO: 12.9 %
NEUTROPHILS # BLD AUTO: 3.45 X10 (3) UL (ref 1.5–7.7)
NEUTROPHILS # BLD AUTO: 3.45 X10(3) UL (ref 1.5–7.7)
NEUTROPHILS NFR BLD AUTO: 55.1 %
OSMOLALITY SERPL CALC.SUM OF ELEC: 287 MOSM/KG (ref 275–295)
PATIENT FASTING Y/N/NP: YES
PLATELET # BLD AUTO: 212 10(3)UL (ref 150–450)
POTASSIUM SERPL-SCNC: 4 MMOL/L (ref 3.5–5.1)
RBC # BLD AUTO: 4.43 X10(6)UL
SODIUM SERPL-SCNC: 139 MMOL/L (ref 136–145)
WBC # BLD AUTO: 6.3 X10(3) UL (ref 4–11)

## 2021-03-19 PROCEDURE — 83615 LACTATE (LD) (LDH) ENZYME: CPT

## 2021-03-19 PROCEDURE — 86140 C-REACTIVE PROTEIN: CPT

## 2021-03-19 PROCEDURE — 80053 COMPREHEN METABOLIC PANEL: CPT

## 2021-03-19 PROCEDURE — 82728 ASSAY OF FERRITIN: CPT

## 2021-03-19 PROCEDURE — 71046 X-RAY EXAM CHEST 2 VIEWS: CPT | Performed by: FAMILY MEDICINE

## 2021-03-19 PROCEDURE — 36415 COLL VENOUS BLD VENIPUNCTURE: CPT

## 2021-03-19 PROCEDURE — 85025 COMPLETE CBC W/AUTO DIFF WBC: CPT

## 2021-03-22 DIAGNOSIS — R74.02 ELEVATED LDH: ICD-10-CM

## 2021-03-22 DIAGNOSIS — R79.89 ELEVATED LIVER FUNCTION TESTS: Primary | ICD-10-CM

## 2021-05-29 ENCOUNTER — HOSPITAL ENCOUNTER (OUTPATIENT)
Dept: GENERAL RADIOLOGY | Age: 51
Discharge: HOME | End: 2021-05-29
Attending: FAMILY MEDICINE
Payer: COMMERCIAL

## 2021-05-29 DIAGNOSIS — U07.1 PNEUMONIA DUE TO 2019-NCOV: ICD-10-CM

## 2021-05-29 DIAGNOSIS — U07.1 PNEUMONIA DUE TO COVID-19 VIRUS: ICD-10-CM

## 2021-05-29 DIAGNOSIS — J12.82 PNEUMONIA DUE TO 2019-NCOV: ICD-10-CM

## 2021-05-29 DIAGNOSIS — J12.82 PNEUMONIA DUE TO COVID-19 VIRUS: ICD-10-CM

## 2021-05-29 PROCEDURE — 71046 X-RAY EXAM CHEST 2 VIEWS: CPT | Performed by: FAMILY MEDICINE

## 2021-11-05 ENCOUNTER — TELEPHONE (OUTPATIENT)
Dept: FAMILY MEDICINE CLINIC | Facility: CLINIC | Age: 51
End: 2021-11-05

## 2021-11-05 ENCOUNTER — LAB ENCOUNTER (OUTPATIENT)
Dept: LAB | Age: 51
End: 2021-11-05
Attending: FAMILY MEDICINE
Payer: COMMERCIAL

## 2021-11-05 ENCOUNTER — LAB ENCOUNTER (OUTPATIENT)
Dept: LAB | Age: 51
End: 2021-11-05
Attending: INTERNAL MEDICINE
Payer: COMMERCIAL

## 2021-11-05 DIAGNOSIS — Z12.11 COLON CANCER SCREENING: ICD-10-CM

## 2021-11-05 DIAGNOSIS — R74.02 ELEVATED LDH: ICD-10-CM

## 2021-11-05 DIAGNOSIS — R79.89 ELEVATED LIVER FUNCTION TESTS: ICD-10-CM

## 2021-11-05 PROCEDURE — 36415 COLL VENOUS BLD VENIPUNCTURE: CPT

## 2021-11-05 PROCEDURE — 83615 LACTATE (LD) (LDH) ENZYME: CPT

## 2021-11-05 PROCEDURE — 80053 COMPREHEN METABOLIC PANEL: CPT

## 2021-11-05 NOTE — TELEPHONE ENCOUNTER
Pt's wife calling re pt's colonoscopy scheduled for Monday 11/8/21 ordered by Dr. Leo Potts. States they were not give prep instructions and called Dr Fransisco Molina office. Wife said office told her to call PCP for instructions. Pls advise.

## 2021-11-05 NOTE — TELEPHONE ENCOUNTER
Pt's wife advised to call back Dr. Cherry Her office for further instructions. Wife voiced understanding. Pt w/ PMHx HTN, anxiety, PSHx appendectomy, ORIF L hip in April 2019 s/p hip hx, s/p recent admission to ortho 9/2-9/4 for elective removal of hardware from L hip, core decompression, abductor repair, bone graft with cells with Dr. Rodríguez on 9/3, discharged to home w/ daughter w/ partial weight bearing, subsequent admission to OSH 9/7-9/10 for SBO (conservative management, no surgery, nor NGT, just observed, IVF), now p/w wound check. Pt and daughter reports pt anemic at OSH was Hb to 7.5. No transfusion of pRBCs. They were advised she may be bleeding into hematoma at hip, and referred to her orthopedist. Pt also started on Ceftin BID x 3 days, for unclear reasons. No f/c. Pt reports she is able to partially weight bear w/ pain. Denies f/c. Denies abd pain, n/v/d/c. Reports today in shower she noted pinkish discharge, thinks she was  bleeding from the wound.

## 2021-11-08 ENCOUNTER — HOSPITAL ENCOUNTER (OUTPATIENT)
Facility: HOSPITAL | Age: 51
Setting detail: HOSPITAL OUTPATIENT SURGERY
Discharge: HOME | End: 2021-11-08
Attending: INTERNAL MEDICINE | Admitting: INTERNAL MEDICINE
Payer: COMMERCIAL

## 2021-11-08 ENCOUNTER — ANESTHESIA EVENT (OUTPATIENT)
Dept: ENDOSCOPY | Facility: HOSPITAL | Age: 51
End: 2021-11-08
Payer: COMMERCIAL

## 2021-11-08 ENCOUNTER — ANESTHESIA (OUTPATIENT)
Dept: ENDOSCOPY | Facility: HOSPITAL | Age: 51
End: 2021-11-08
Payer: COMMERCIAL

## 2021-11-08 VITALS
BODY MASS INDEX: 31.08 KG/M2 | HEIGHT: 75 IN | RESPIRATION RATE: 16 BRPM | OXYGEN SATURATION: 96 % | TEMPERATURE: 97 F | WEIGHT: 250 LBS | HEART RATE: 61 BPM | SYSTOLIC BLOOD PRESSURE: 126 MMHG | DIASTOLIC BLOOD PRESSURE: 83 MMHG

## 2021-11-08 DIAGNOSIS — Z12.11 COLON CANCER SCREENING: Primary | ICD-10-CM

## 2021-11-08 PROCEDURE — 88305 TISSUE EXAM BY PATHOLOGIST: CPT | Performed by: INTERNAL MEDICINE

## 2021-11-08 PROCEDURE — 0DBP8ZX EXCISION OF RECTUM, VIA NATURAL OR ARTIFICIAL OPENING ENDOSCOPIC, DIAGNOSTIC: ICD-10-PCS | Performed by: INTERNAL MEDICINE

## 2021-11-08 RX ORDER — SODIUM CHLORIDE, SODIUM LACTATE, POTASSIUM CHLORIDE, CALCIUM CHLORIDE 600; 310; 30; 20 MG/100ML; MG/100ML; MG/100ML; MG/100ML
INJECTION, SOLUTION INTRAVENOUS CONTINUOUS
Status: DISCONTINUED | OUTPATIENT
Start: 2021-11-08 | End: 2021-11-08

## 2021-11-08 RX ORDER — LIDOCAINE HYDROCHLORIDE 40 MG/ML
INJECTION, SOLUTION RETROBULBAR; TOPICAL AS NEEDED
Status: DISCONTINUED | OUTPATIENT
Start: 2021-11-08 | End: 2021-11-08 | Stop reason: SURG

## 2021-11-08 RX ORDER — ASPIRIN 81 MG/1
81 TABLET ORAL DAILY
COMMUNITY

## 2021-11-08 RX ADMIN — LIDOCAINE HYDROCHLORIDE 50 MG: 40 INJECTION, SOLUTION RETROBULBAR; TOPICAL at 12:21:00

## 2021-11-08 NOTE — H&P
[facility]  History & Physical    Kevyn Brown Patient Status:  Hospital Outpatient Surgery    1970 MRN [id number]   Location [address] [address] Attending Grisel Gillespie MD   Hosp Day # 0 PCP Autumn Mendoza MD upset, automatic defibrillator, angina, other implanted devices, irregular heart rate/palpitations, high cholesterol or triglycerides, coronary stents.   Respiratory: Denies shortness of breath, chronic/frequent hoarseness, wheezing, exposure to tuberculosi non-tender, non-distended, no masses, no guarding, no     rebound, positive BS. Extremity: no edema, no cyanosis   Skin: No rashes or lesions.     Neurological: Alert, interactive, no focal deficits    ASA Score: 3-Patient with severe systemic disease

## 2021-11-08 NOTE — OPERATIVE REPORT
Operative Report-Colonoscopy    PREOPERATIVE DIAGNOSIS/INDICATION: Screening colonoscopy     POSTOPERTATIVE DIAGNOSIS: Incomplete colonoscopy, colon polyps, internal hemorrhoids, external hemorrhoids     PRO bleeding, infection, perforation, adverse drug reactions     - CT colonography    - Repeat colonoscopy 3 years if CT colonography negative; if there is a lesion seen on CT colonography, will need reconsider options for repeat procedure.      Mary Huizar

## 2021-11-08 NOTE — ANESTHESIA POSTPROCEDURE EVALUATION
[facility] Patient Status:  Hospital Outpatient Surgery   Age/Gender 49year old male MRN [id number]   Location [id number] [place] [address] Attending Kiara Stroud MD   Hosp Day # 0 PCP MD Korin Dougherty

## 2021-11-08 NOTE — ANESTHESIA PREPROCEDURE EVALUATION
PRE-OP EVALUATION    Patient Name: Elvie Carroll    Admit Diagnosis: Colon cancer screening [Z12.11]    Pre-op Diagnosis: Colon cancer screening [Z12.11]    COLONOSCOPY    Anesthesia Procedure: COLONOSCOPY (N/A )    Surgeon(s) and Role:     * Ab Trent Airway      Mallampati: II  Mouth opening: >3 FB  TM distance: 4 - 6 cm  Neck ROM: full Cardiovascular    Cardiovascular exam normal.  Rhythm: regular  Rate: normal     Dental  Comment: No gross abnormalities noted on exam. Patient denies any lo

## 2021-12-11 DIAGNOSIS — I10 ESSENTIAL HYPERTENSION: ICD-10-CM

## 2021-12-14 RX ORDER — LISINOPRIL 10 MG/1
10 TABLET ORAL DAILY
Qty: 30 TABLET | Refills: 0 | Status: SHIPPED | OUTPATIENT
Start: 2021-12-14 | End: 2022-02-01

## 2022-01-08 DIAGNOSIS — I10 ESSENTIAL HYPERTENSION: ICD-10-CM

## 2022-01-10 RX ORDER — LISINOPRIL 10 MG/1
10 TABLET ORAL DAILY
Qty: 30 TABLET | Refills: 0 | OUTPATIENT
Start: 2022-01-10

## 2022-02-01 DIAGNOSIS — I10 ESSENTIAL HYPERTENSION: ICD-10-CM

## 2022-02-01 RX ORDER — LISINOPRIL 10 MG/1
10 TABLET ORAL DAILY
Qty: 30 TABLET | Refills: 2 | Status: SHIPPED | OUTPATIENT
Start: 2022-02-01 | End: 2022-04-18

## 2022-04-18 ENCOUNTER — OFFICE VISIT (OUTPATIENT)
Dept: FAMILY MEDICINE CLINIC | Facility: CLINIC | Age: 52
End: 2022-04-18
Payer: COMMERCIAL

## 2022-04-18 VITALS
RESPIRATION RATE: 16 BRPM | WEIGHT: 256 LBS | HEIGHT: 75 IN | HEART RATE: 64 BPM | BODY MASS INDEX: 31.83 KG/M2 | SYSTOLIC BLOOD PRESSURE: 116 MMHG | DIASTOLIC BLOOD PRESSURE: 80 MMHG

## 2022-04-18 DIAGNOSIS — Z00.00 LABORATORY TESTS ORDERED AS PART OF A COMPLETE PHYSICAL EXAM (CPE): ICD-10-CM

## 2022-04-18 DIAGNOSIS — I10 ESSENTIAL HYPERTENSION: Primary | ICD-10-CM

## 2022-04-18 PROCEDURE — 3008F BODY MASS INDEX DOCD: CPT | Performed by: FAMILY MEDICINE

## 2022-04-18 PROCEDURE — 99213 OFFICE O/P EST LOW 20 MIN: CPT | Performed by: FAMILY MEDICINE

## 2022-04-18 PROCEDURE — 3079F DIAST BP 80-89 MM HG: CPT | Performed by: FAMILY MEDICINE

## 2022-04-18 PROCEDURE — 3074F SYST BP LT 130 MM HG: CPT | Performed by: FAMILY MEDICINE

## 2022-04-18 RX ORDER — LISINOPRIL 10 MG/1
10 TABLET ORAL DAILY
Qty: 90 TABLET | Refills: 1 | Status: SHIPPED | OUTPATIENT
Start: 2022-04-18

## 2022-04-18 NOTE — PATIENT INSTRUCTIONS
Continue current meds. Watch diet for fats and carbs. Stay active. Call [phone] to schedule fasting blood tests 1 week prior physical.   Schedule physical for September 2022.

## 2022-05-04 PROBLEM — Z86.010 PERSONAL HISTORY OF COLONIC POLYPS: Status: ACTIVE | Noted: 2022-05-04

## 2022-05-04 PROBLEM — Z86.0100 PERSONAL HISTORY OF COLONIC POLYPS: Status: ACTIVE | Noted: 2022-05-04

## 2022-07-19 ENCOUNTER — HOSPITAL ENCOUNTER (OUTPATIENT)
Facility: HOSPITAL | Age: 52
Setting detail: HOSPITAL OUTPATIENT SURGERY
Discharge: HOME | End: 2022-07-19
Attending: INTERNAL MEDICINE | Admitting: INTERNAL MEDICINE
Payer: COMMERCIAL

## 2022-07-19 ENCOUNTER — ANESTHESIA EVENT (OUTPATIENT)
Dept: ENDOSCOPY | Facility: HOSPITAL | Age: 52
End: 2022-07-19
Payer: COMMERCIAL

## 2022-07-19 ENCOUNTER — ANESTHESIA (OUTPATIENT)
Dept: ENDOSCOPY | Facility: HOSPITAL | Age: 52
End: 2022-07-19
Payer: COMMERCIAL

## 2022-07-19 VITALS
WEIGHT: 254 LBS | TEMPERATURE: 97 F | SYSTOLIC BLOOD PRESSURE: 122 MMHG | OXYGEN SATURATION: 96 % | HEIGHT: 75 IN | BODY MASS INDEX: 31.58 KG/M2 | DIASTOLIC BLOOD PRESSURE: 81 MMHG | HEART RATE: 65 BPM | RESPIRATION RATE: 19 BRPM

## 2022-07-19 DIAGNOSIS — Z86.010 PERSONAL HISTORY OF COLONIC POLYPS: ICD-10-CM

## 2022-07-19 PROCEDURE — 0DJD8ZZ INSPECTION OF LOWER INTESTINAL TRACT, VIA NATURAL OR ARTIFICIAL OPENING ENDOSCOPIC: ICD-10-PCS | Performed by: INTERNAL MEDICINE

## 2022-07-19 RX ORDER — LIDOCAINE HYDROCHLORIDE 10 MG/ML
INJECTION, SOLUTION EPIDURAL; INFILTRATION; INTRACAUDAL; PERINEURAL AS NEEDED
Status: DISCONTINUED | OUTPATIENT
Start: 2022-07-19 | End: 2022-07-19 | Stop reason: SURG

## 2022-07-19 RX ORDER — NALOXONE HYDROCHLORIDE 0.4 MG/ML
80 INJECTION, SOLUTION INTRAMUSCULAR; INTRAVENOUS; SUBCUTANEOUS AS NEEDED
Status: DISCONTINUED | OUTPATIENT
Start: 2022-07-19 | End: 2022-07-19

## 2022-07-19 RX ORDER — SODIUM CHLORIDE, SODIUM LACTATE, POTASSIUM CHLORIDE, CALCIUM CHLORIDE 600; 310; 30; 20 MG/100ML; MG/100ML; MG/100ML; MG/100ML
INJECTION, SOLUTION INTRAVENOUS CONTINUOUS
Status: DISCONTINUED | OUTPATIENT
Start: 2022-07-19 | End: 2022-07-19

## 2022-07-19 RX ADMIN — LIDOCAINE HYDROCHLORIDE 50 MG: 10 INJECTION, SOLUTION EPIDURAL; INFILTRATION; INTRACAUDAL; PERINEURAL at 07:44:00

## 2022-07-19 NOTE — ANESTHESIA POSTPROCEDURE EVALUATION
[address] Patient Status:  Hospital Outpatient Surgery   Age/Gender 47year old male MRN [id number]   Location [address] [address] Attending Jerome Andrews MD   [facility] Day # 0 PCP Yordy Oneill MD       Anesthesia Post-op Note    COLONOSCOPY    Procedure Summary     Date: 07/19/22 Room / Location: [facility] ENDOSCOPY 02 / [facility] ENDOSCOPY    Anesthesia Start: 0740 Anesthesia Stop:     Procedure: COLONOSCOPY (N/A ) Diagnosis:       Personal history of colonic polyps      (normal)    Surgeons: Jerome Andrews MD Anesthesiologist: Oscar Mcelroy MD    Anesthesia Type: MAC ASA Status: 2          Anesthesia Type: MAC    Vitals Value Taken Time   /75 07/19/22 0816   Temp na 07/19/22 0817   Pulse 72 07/19/22 0817   Resp 16 07/19/22 0817   SpO2 98 % 07/19/22 0817   Vitals shown include unvalidated device data. Patient Location: Endoscopy    Anesthesia Type: MAC    Airway Patency: patent    Postop Pain Control: adequate    Mental Status: mildly sedated but able to meaningfully participate in the post-anesthesia evaluation    Nausea/Vomiting: none    Cardiopulmonary/Hydration status: stable euvolemic    Complications: no apparent anesthesia related complications    Postop vital signs: stable    Dental Exam: Unchanged from Preop    Patient to be discharged from PACU when criteria met.

## 2022-07-19 NOTE — OPERATIVE REPORT
Kevin Ibrahim Patient Status:  Hospital Outpatient Surgery    1970 MRN [id number]   Location [address] [place] [address] Attending Sarkis Karimi MD   Date 2022 PCP Dipesh Quispe MD     PREOPERATIVE DIAGNOSIS/INDICATION:  H/o colon adenomas during incomplete colonoscopy   Abnormal CT colonography with polyps  POSTOPERTATIVE DIAGNOSIS: Normal  PROCEDURE PERFORMED: COLONOSCOPY  SEDATION: MAC sedation provided by General Anesthesia    TIME OUT WAS PERFORMED    INFORMED CONSENT: Risks, benefits and alternatives to the procedure were explained to the patient including but not limited to bleeding, infection, perforation, adverse drug reactions, pancreatitis and the need for hospitalization and surgery if this occurs, the patient understands and agrees to procedure. PROCEDURE DESCRIPTION: After careful digital rectal examination a pediatric colonoscope was introduced into the patients rectum, advanced pass the recto sigmoid junction, into the descending colon, splenic flexure, transverse colon, hepatic flexure, ascending colon, cecum, confirmed by landmarks, including the appendiceal orifice and ileocecal valve. Careful examination of the above described areas was performed on withdrawal of the endoscope. Retroflexion was performed on the rectum. The patient tolerated the procedure well, there were no immediate complication immediately following the procedure, and the patient was transferred to recovery in stable condition. QUALITY OF PREPARATION: [place] Bowel Preparation Scale:            -      Right colon 3, Transverse colon 3, Left colon 3   FINDINGS/THERAPEUTICS:  - COLON: Normal  RECOMMENDATIONS:   - Post Colonoscopy precautions, watch for bleeding, infection, perforation, adverse drug reactions   - Repeat colonoscopy in 7 years.   Marianela Davis MD  2022  8:14 AM

## 2022-10-28 DIAGNOSIS — I10 ESSENTIAL HYPERTENSION: ICD-10-CM

## 2022-10-28 RX ORDER — LISINOPRIL 10 MG/1
10 TABLET ORAL DAILY
Qty: 30 TABLET | Refills: 0 | Status: SHIPPED | OUTPATIENT
Start: 2022-10-28

## 2022-11-19 DIAGNOSIS — I10 ESSENTIAL HYPERTENSION: ICD-10-CM

## 2022-11-21 RX ORDER — LISINOPRIL 10 MG/1
10 TABLET ORAL DAILY
Qty: 30 TABLET | Refills: 0 | Status: SHIPPED | OUTPATIENT
Start: 2022-11-21

## 2022-11-21 NOTE — TELEPHONE ENCOUNTER
Did not pass protocol  Hypertension Medications Protocol Failed 11/19/2022 12:31 PM   Protocol Details  CMP or BMP in past 12 months    Appointment in past 6 or next 3 months         Last office visit 4/18/2022  Last refill was: 10/28, 30 tabs  Next appointment: due for annual physical  Please sign pended medication if appropriate

## 2022-12-15 DIAGNOSIS — I10 ESSENTIAL HYPERTENSION: ICD-10-CM

## 2022-12-15 RX ORDER — LISINOPRIL 10 MG/1
10 TABLET ORAL DAILY
Qty: 30 TABLET | Refills: 0 | Status: SHIPPED | OUTPATIENT
Start: 2022-12-15

## 2023-01-23 DIAGNOSIS — I10 ESSENTIAL HYPERTENSION: ICD-10-CM

## 2023-01-24 RX ORDER — LISINOPRIL 10 MG/1
10 TABLET ORAL DAILY
Qty: 30 TABLET | Refills: 0 | Status: SHIPPED | OUTPATIENT
Start: 2023-01-24

## 2023-02-13 ENCOUNTER — OFFICE VISIT (OUTPATIENT)
Dept: FAMILY MEDICINE CLINIC | Facility: CLINIC | Age: 53
End: 2023-02-13
Payer: COMMERCIAL

## 2023-02-13 ENCOUNTER — LAB ENCOUNTER (OUTPATIENT)
Dept: LAB | Age: 53
End: 2023-02-13
Attending: FAMILY MEDICINE
Payer: COMMERCIAL

## 2023-02-13 VITALS
RESPIRATION RATE: 18 BRPM | DIASTOLIC BLOOD PRESSURE: 84 MMHG | SYSTOLIC BLOOD PRESSURE: 138 MMHG | WEIGHT: 257.38 LBS | BODY MASS INDEX: 32 KG/M2 | TEMPERATURE: 98 F | HEART RATE: 76 BPM | HEIGHT: 75 IN

## 2023-02-13 DIAGNOSIS — R73.9 HYPERGLYCEMIA: ICD-10-CM

## 2023-02-13 DIAGNOSIS — E04.1 THYROID NODULE: ICD-10-CM

## 2023-02-13 DIAGNOSIS — R73.9 HYPERGLYCEMIA: Primary | ICD-10-CM

## 2023-02-13 DIAGNOSIS — Z00.00 LABORATORY TESTS ORDERED AS PART OF A COMPLETE PHYSICAL EXAM (CPE): ICD-10-CM

## 2023-02-13 DIAGNOSIS — I10 ESSENTIAL HYPERTENSION: ICD-10-CM

## 2023-02-13 DIAGNOSIS — Z00.00 PHYSICAL EXAM, ANNUAL: Primary | ICD-10-CM

## 2023-02-13 LAB
ALBUMIN SERPL-MCNC: 4 G/DL (ref 3.4–5)
ALBUMIN/GLOB SERPL: 1.2 {RATIO} (ref 1–2)
ALP LIVER SERPL-CCNC: 80 U/L
ALT SERPL-CCNC: 47 U/L
ANION GAP SERPL CALC-SCNC: 10 MMOL/L (ref 0–18)
AST SERPL-CCNC: 17 U/L (ref 15–37)
BASOPHILS # BLD AUTO: 0.05 X10(3) UL (ref 0–0.2)
BASOPHILS NFR BLD AUTO: 0.6 %
BILIRUB SERPL-MCNC: 0.5 MG/DL (ref 0.1–2)
BILIRUB UR QL: NEGATIVE
BUN BLD-MCNC: 15 MG/DL (ref 7–18)
BUN/CREAT SERPL: 14.7 (ref 10–20)
CALCIUM BLD-MCNC: 9.4 MG/DL (ref 8.5–10.1)
CHLORIDE SERPL-SCNC: 108 MMOL/L (ref 98–112)
CHOLEST SERPL-MCNC: 174 MG/DL (ref ?–200)
CLARITY UR: CLEAR
CO2 SERPL-SCNC: 25 MMOL/L (ref 21–32)
COLOR UR: YELLOW
COMPLEXED PSA SERPL-MCNC: 4.15 NG/ML (ref ?–4)
CREAT BLD-MCNC: 1.02 MG/DL
DEPRECATED RDW RBC AUTO: 43.8 FL (ref 35.1–46.3)
EOSINOPHIL # BLD AUTO: 0.15 X10(3) UL (ref 0–0.7)
EOSINOPHIL NFR BLD AUTO: 1.9 %
ERYTHROCYTE [DISTWIDTH] IN BLOOD BY AUTOMATED COUNT: 12.8 % (ref 11–15)
EST. AVERAGE GLUCOSE BLD GHB EST-MCNC: 114 MG/DL (ref 68–126)
FASTING PATIENT LIPID ANSWER: YES
FASTING STATUS PATIENT QL REPORTED: YES
GFR SERPLBLD BASED ON 1.73 SQ M-ARVRAT: 88 ML/MIN/1.73M2 (ref 60–?)
GLOBULIN PLAS-MCNC: 3.3 G/DL (ref 2.8–4.4)
GLUCOSE BLD-MCNC: 119 MG/DL (ref 70–99)
GLUCOSE UR-MCNC: NEGATIVE MG/DL
HBA1C MFR BLD: 5.6 % (ref ?–5.7)
HCT VFR BLD AUTO: 45.1 %
HDLC SERPL-MCNC: 62 MG/DL (ref 40–59)
HGB BLD-MCNC: 15.5 G/DL
HGB UR QL STRIP.AUTO: NEGATIVE
IMM GRANULOCYTES # BLD AUTO: 0.02 X10(3) UL (ref 0–1)
IMM GRANULOCYTES NFR BLD: 0.3 %
KETONES UR-MCNC: NEGATIVE MG/DL
LDLC SERPL CALC-MCNC: 84 MG/DL (ref ?–100)
LYMPHOCYTES # BLD AUTO: 2.72 X10(3) UL (ref 1–4)
LYMPHOCYTES NFR BLD AUTO: 35.2 %
MCH RBC QN AUTO: 32 PG (ref 26–34)
MCHC RBC AUTO-ENTMCNC: 34.4 G/DL (ref 31–37)
MCV RBC AUTO: 93 FL
MONOCYTES # BLD AUTO: 0.57 X10(3) UL (ref 0.1–1)
MONOCYTES NFR BLD AUTO: 7.4 %
NEUTROPHILS # BLD AUTO: 4.21 X10 (3) UL (ref 1.5–7.7)
NEUTROPHILS # BLD AUTO: 4.21 X10(3) UL (ref 1.5–7.7)
NEUTROPHILS NFR BLD AUTO: 54.6 %
NITRITE UR QL STRIP.AUTO: NEGATIVE
NONHDLC SERPL-MCNC: 112 MG/DL (ref ?–130)
OSMOLALITY SERPL CALC.SUM OF ELEC: 298 MOSM/KG (ref 275–295)
PH UR: 5.5 [PH] (ref 5–8)
PLATELET # BLD AUTO: 265 10(3)UL (ref 150–450)
POTASSIUM SERPL-SCNC: 4.1 MMOL/L (ref 3.5–5.1)
PROT SERPL-MCNC: 7.3 G/DL (ref 6.4–8.2)
PROT UR-MCNC: NEGATIVE MG/DL
RBC # BLD AUTO: 4.85 X10(6)UL
SODIUM SERPL-SCNC: 143 MMOL/L (ref 136–145)
SP GR UR STRIP: 1.02 (ref 1–1.03)
TRIGL SERPL-MCNC: 164 MG/DL (ref 30–149)
TSI SER-ACNC: 0.66 MIU/ML (ref 0.36–3.74)
UROBILINOGEN UR STRIP-ACNC: 0.2
VLDLC SERPL CALC-MCNC: 26 MG/DL (ref 0–30)
WBC # BLD AUTO: 7.7 X10(3) UL (ref 4–11)

## 2023-02-13 PROCEDURE — 99396 PREV VISIT EST AGE 40-64: CPT | Performed by: FAMILY MEDICINE

## 2023-02-13 PROCEDURE — 81015 MICROSCOPIC EXAM OF URINE: CPT

## 2023-02-13 PROCEDURE — 81001 URINALYSIS AUTO W/SCOPE: CPT

## 2023-02-13 PROCEDURE — 80053 COMPREHEN METABOLIC PANEL: CPT

## 2023-02-13 PROCEDURE — 85025 COMPLETE CBC W/AUTO DIFF WBC: CPT

## 2023-02-13 PROCEDURE — 80061 LIPID PANEL: CPT

## 2023-02-13 PROCEDURE — 83036 HEMOGLOBIN GLYCOSYLATED A1C: CPT

## 2023-02-13 PROCEDURE — 84443 ASSAY THYROID STIM HORMONE: CPT

## 2023-02-13 PROCEDURE — 3075F SYST BP GE 130 - 139MM HG: CPT | Performed by: FAMILY MEDICINE

## 2023-02-13 PROCEDURE — 3008F BODY MASS INDEX DOCD: CPT | Performed by: FAMILY MEDICINE

## 2023-02-13 PROCEDURE — 3079F DIAST BP 80-89 MM HG: CPT | Performed by: FAMILY MEDICINE

## 2023-02-13 PROCEDURE — 87086 URINE CULTURE/COLONY COUNT: CPT

## 2023-02-13 RX ORDER — LISINOPRIL 10 MG/1
10 TABLET ORAL DAILY
Qty: 90 TABLET | Refills: 1 | Status: SHIPPED | OUTPATIENT
Start: 2023-02-13

## 2023-02-13 NOTE — PATIENT INSTRUCTIONS
Shingrix shingles vaccination. Call [phone] to schedule  ultrasound thyroid. Continue current meds. Watch diet for fats and carbs. Stay active.

## 2023-02-15 DIAGNOSIS — R97.20 ELEVATED PSA: Primary | ICD-10-CM

## 2023-02-25 ENCOUNTER — HOSPITAL ENCOUNTER (OUTPATIENT)
Dept: ULTRASOUND IMAGING | Age: 53
Discharge: HOME | End: 2023-02-25
Attending: FAMILY MEDICINE
Payer: COMMERCIAL

## 2023-02-25 DIAGNOSIS — E04.1 THYROID NODULE: ICD-10-CM

## 2023-02-25 PROCEDURE — 76536 US EXAM OF HEAD AND NECK: CPT | Performed by: FAMILY MEDICINE

## 2023-06-19 ENCOUNTER — LAB ENCOUNTER (OUTPATIENT)
Dept: LAB | Age: 53
End: 2023-06-19
Attending: FAMILY MEDICINE
Payer: COMMERCIAL

## 2023-06-19 DIAGNOSIS — R97.20 ELEVATED PSA: ICD-10-CM

## 2023-06-19 LAB — PSA SERPL-MCNC: 5.51 NG/ML (ref ?–4)

## 2023-06-19 PROCEDURE — 84153 ASSAY OF PSA TOTAL: CPT

## 2023-06-19 PROCEDURE — 36415 COLL VENOUS BLD VENIPUNCTURE: CPT

## 2023-06-21 DIAGNOSIS — R97.20 ELEVATED PSA: Primary | ICD-10-CM

## 2023-06-30 ENCOUNTER — PATIENT MESSAGE (OUTPATIENT)
Dept: SURGERY | Facility: CLINIC | Age: 53
End: 2023-06-30

## 2023-06-30 NOTE — TELEPHONE ENCOUNTER
From: Mallorie Matthews  To: Dorene Morin MD  Sent: 6/30/2023 11:39 AM CDT  Subject: Nneka    Hello, are there any earlier appointments then the date I set earlier? I am feeling anxious about my health and I am wondering if you have any earlier appointments available?  If any cancelations could you call me Thank you

## 2023-07-06 NOTE — H&P
HPI:     Dianna Malcolm is a 47year old male with a PMH of HTN, Hep A, LBP/OA. He presents as a consult with:  1. Elevated PSA  2. Mild urge incontinence  3. BPH/LUTS  4. ED    PCP - Samantha    He currently feels well. Appetite and energy are good. AUA SS is 2/35 with 1 n, f. Mostly happy with LUTS, reviewed proscar as option and declines meds. Incontinence: mild UI/dribbles a couple times a week  WIN: ~ 40 g prostate, no nodules or tenderness    UA is negative    ~ 50% potency. Has not tried anything in the past. Discussed both viagra and cialis as options and reviewed the risks and benefits and possible side effects. He would like to try viagra with coupon to [place].     Prior PSAs:  - 5.51 6/19/23  - 4.15 2/13/23  - 3.42 2/15/21  - 2.9 3/7/20  - 1.17 9/24/16    UTI hx: none  Gross hematuria: none  Tobacco hx: none  Kidney stone hx: none  Fam h/o  malignancy: none    We discussed options for elevated PSA, including serial PSA measurements, 4K score, MRI of the prostate, or prostate biopsy and the risks and benefits to each option. He would prefer checking 4K score. We discussed Kegel exercises for incontinence. I provided and reviewed educational materials for this. Checking 4K, 100 mg viagra prn ([place]), Kegels for UI, observation for BPH/LUTS. HISTORY:  Past Medical History:   Diagnosis Date    Arthritis     COVID 03/2021    Hospitalized for 5 days.  SOB/Fever    Hepatitis A     High blood pressure     LBP (low back pain)       Past Surgical History:   Procedure Laterality Date    COLONOSCOPY N/A 11/8/2021    Procedure: COLONOSCOPY with forcep polypectomy;  Surgeon: Katharine Brown MD;  Location: [facility] ENDOSCOPY    COLONOSCOPY N/A 7/19/2022    Procedure: COLONOSCOPY;  Surgeon: Gerber Puente MD;  Location: [facility] ENDOSCOPY      Family History   Problem Relation Age of Onset    Other (accident) Father     Hypertension Mother     Other (benign htn) Other       Social History:   Social History Socioeconomic History    Marital status:    Tobacco Use    Smoking status: Never    Smokeless tobacco: Never   Vaping Use    Vaping Use: Never used   Substance and Sexual Activity    Alcohol use: Yes     Alcohol/week: 0.0 - 2.0 standard drinks of alcohol     Comment: weekends    Drug use: No    Sexual activity: Yes   Other Topics Concern    Caffeine Concern Yes    Exercise Yes     Comment: 2-3 xs per wk        Medications (Active prior to today's visit):  Current Outpatient Medications   Medication Sig Dispense Refill    Sildenafil Citrate 100 MG Oral Tab Take 1 tablet (100 mg total) by mouth daily as needed for Erectile Dysfunction. Take ~ one hour prior to sexual activity on an empty stomach 30 tablet 5    lisinopril 10 MG Oral Tab Take 1 tablet (10 mg total) by mouth daily. Schedule follow-up appointment. 90 tablet 1       Allergies:    Penicillins             RASH      ROS:     A comprehensive 10 point review of systems was completed. Pertinent positives and negatives noted in the the HPI. PHYSICAL EXAM:     GENERAL APPEARANCE: well, developed, well nourished, in no acute distress  NEUROLOGIC: nonfocal, alert and oriented  HEAD: normocephalic, atraumatic  EYES: sclera non-icteric  EARS: hearing intact  ORAL CAVITY: mucosa moist  NECK/THYROID: no obvious goiter or masses  LUNGS: nonlabored breathing  ABDOMEN: soft, no obvious masses or tenderness  SKIN: no obvious rashes    : as noted above     ASSESSMENT/PLAN:   Diagnoses and all orders for this visit:    Elevated PSA  -     URINALYSIS, AUTO, W/O SCOPE    Erectile dysfunction, unspecified erectile dysfunction type  -     Sildenafil Citrate 100 MG Oral Tab; Take 1 tablet (100 mg total) by mouth daily as needed for Erectile Dysfunction. Take ~ one hour prior to sexual activity on an empty stomach    BPH with obstruction/lower urinary tract symptoms    Urge incontinence    - as noted above. Thanks again for this consult.     Christopher Madsen MD, [address] [facility]  Office: [phone]

## 2023-07-12 ENCOUNTER — LAB ENCOUNTER (OUTPATIENT)
Dept: LAB | Facility: HOSPITAL | Age: 53
End: 2023-07-12
Attending: UROLOGY
Payer: COMMERCIAL

## 2023-07-12 ENCOUNTER — OFFICE VISIT (OUTPATIENT)
Dept: SURGERY | Facility: CLINIC | Age: 53
End: 2023-07-12

## 2023-07-12 DIAGNOSIS — N40.1 BPH WITH OBSTRUCTION/LOWER URINARY TRACT SYMPTOMS: ICD-10-CM

## 2023-07-12 DIAGNOSIS — N39.41 URGE INCONTINENCE: ICD-10-CM

## 2023-07-12 DIAGNOSIS — N52.9 ERECTILE DYSFUNCTION, UNSPECIFIED ERECTILE DYSFUNCTION TYPE: ICD-10-CM

## 2023-07-12 DIAGNOSIS — R97.20 ELEVATED PROSTATE SPECIFIC ANTIGEN (PSA): Primary | ICD-10-CM

## 2023-07-12 DIAGNOSIS — R97.20 ELEVATED PSA: Primary | ICD-10-CM

## 2023-07-12 DIAGNOSIS — N13.8 BPH WITH OBSTRUCTION/LOWER URINARY TRACT SYMPTOMS: ICD-10-CM

## 2023-07-12 LAB
BILIRUBIN: NEGATIVE
GLUCOSE (URINE DIPSTICK): NEGATIVE MG/DL
KETONES (URINE DIPSTICK): NEGATIVE MG/DL
LEUKOCYTES: NEGATIVE
MULTISTIX LOT#: NORMAL NUMERIC
NITRITE, URINE: NEGATIVE
OCCULT BLOOD: NEGATIVE
PH, URINE: 7 (ref 4.5–8)
PROTEIN (URINE DIPSTICK): NEGATIVE MG/DL
SPECIFIC GRAVITY: 1.02 (ref 1–1.03)
URINE-COLOR: YELLOW
UROBILINOGEN,SEMI-QN: 0.2 MG/DL (ref 0–1.9)

## 2023-07-12 PROCEDURE — 81003 URINALYSIS AUTO W/O SCOPE: CPT | Performed by: UROLOGY

## 2023-07-12 PROCEDURE — 36415 COLL VENOUS BLD VENIPUNCTURE: CPT

## 2023-07-12 PROCEDURE — 99244 OFF/OP CNSLTJ NEW/EST MOD 40: CPT | Performed by: UROLOGY

## 2023-07-12 RX ORDER — SILDENAFIL 100 MG/1
100 TABLET, FILM COATED ORAL
Qty: 30 TABLET | Refills: 5 | Status: SHIPPED | OUTPATIENT
Start: 2023-07-12

## 2023-07-17 ENCOUNTER — TELEPHONE (OUTPATIENT)
Dept: SURGERY | Facility: CLINIC | Age: 53
End: 2023-07-17

## 2023-07-17 NOTE — TELEPHONE ENCOUNTER
Reviewed 4K score with patient. 4K 8.7% and repeat PSA 2.96. He will continue annual PSA checks with PCP.

## 2023-08-15 ENCOUNTER — PATIENT MESSAGE (OUTPATIENT)
Dept: FAMILY MEDICINE CLINIC | Facility: CLINIC | Age: 53
End: 2023-08-15

## 2023-08-15 DIAGNOSIS — I10 ESSENTIAL HYPERTENSION: ICD-10-CM

## 2023-08-15 RX ORDER — LISINOPRIL 10 MG/1
10 TABLET ORAL DAILY
Qty: 30 TABLET | Refills: 0 | Status: SHIPPED | OUTPATIENT
Start: 2023-08-15

## 2023-08-15 NOTE — TELEPHONE ENCOUNTER
LOV: 02/13/2023  for: CPX  Patient advised to RTC on:  CPX in 1 year. Medication Quantity Refills Start End   lisinopril 10 MG Oral Tab 90 tablet 1 2/13/2023    Sig:   Take 1 tablet (10 mg total) by mouth daily. Schedule follow-up appointment.

## 2023-09-07 DIAGNOSIS — I10 ESSENTIAL HYPERTENSION: ICD-10-CM

## 2023-09-07 RX ORDER — LISINOPRIL 10 MG/1
10 TABLET ORAL DAILY
Qty: 30 TABLET | Refills: 2 | Status: SHIPPED | OUTPATIENT
Start: 2023-09-07

## 2023-09-07 NOTE — TELEPHONE ENCOUNTER
PT set up med check appt for 12/18.   Pls refill the following medication;    lisinopril 10 MG Oral Tab 30 tablet       RX:    [facility] [place] [place] - [address] [facility] [address] [phone]     TY

## 2023-09-07 NOTE — TELEPHONE ENCOUNTER
Protocol did not pass. LOV: 2/13/23 (Well adult)    Last Refill: 8/15/23 #30 tablet 0Refill    Next Appointment:   12/18/23  Ok to wait this late for medcheck or patient needs to be seen sooner? Please see pended order and sign if appropriate.

## 2023-12-08 DIAGNOSIS — I10 ESSENTIAL HYPERTENSION: ICD-10-CM

## 2023-12-08 RX ORDER — LISINOPRIL 10 MG/1
10 TABLET ORAL DAILY
Qty: 30 TABLET | Refills: 0 | Status: SHIPPED | OUTPATIENT
Start: 2023-12-08

## 2023-12-08 NOTE — TELEPHONE ENCOUNTER
LOV: 02/13/2023  for: CPX  Patient advised to RTC on:   CPX in 1 year   Nov:12/18/2023    Medication Quantity Refills Start End   lisinopril 10 MG Oral Tab 30 tablet 2 9/7/2023    Sig:   Take 1 tablet (10 mg total) by mouth daily. Schedule follow-up appointment.

## 2023-12-18 ENCOUNTER — OFFICE VISIT (OUTPATIENT)
Dept: FAMILY MEDICINE CLINIC | Facility: CLINIC | Age: 53
End: 2023-12-18
Payer: COMMERCIAL

## 2023-12-18 VITALS
HEIGHT: 75 IN | HEART RATE: 70 BPM | DIASTOLIC BLOOD PRESSURE: 82 MMHG | BODY MASS INDEX: 32.2 KG/M2 | TEMPERATURE: 98 F | WEIGHT: 259 LBS | RESPIRATION RATE: 14 BRPM | SYSTOLIC BLOOD PRESSURE: 123 MMHG

## 2023-12-18 DIAGNOSIS — Z00.00 LABORATORY EXAMINATION ORDERED AS PART OF A ROUTINE GENERAL MEDICAL EXAMINATION: ICD-10-CM

## 2023-12-18 DIAGNOSIS — R97.20 ELEVATED PSA MEASUREMENT: ICD-10-CM

## 2023-12-18 DIAGNOSIS — I10 ESSENTIAL HYPERTENSION: Primary | ICD-10-CM

## 2023-12-18 RX ORDER — LISINOPRIL 10 MG/1
10 TABLET ORAL DAILY
Qty: 90 TABLET | Refills: 1 | Status: SHIPPED | OUTPATIENT
Start: 2023-12-18

## 2023-12-18 NOTE — PATIENT INSTRUCTIONS
Continue current meds. Watch diet for fats and carbs. Try not to drink fluids 2 hours before bedtime. Stay active.    Call [phone] to schedule fasting blood tests 1 week prior physical.

## 2024-02-22 ENCOUNTER — LAB ENCOUNTER (OUTPATIENT)
Dept: LAB | Age: 54
End: 2024-02-22
Attending: FAMILY MEDICINE
Payer: COMMERCIAL

## 2024-02-22 DIAGNOSIS — R97.20 ELEVATED PSA MEASUREMENT: ICD-10-CM

## 2024-02-22 DIAGNOSIS — Z00.00 LABORATORY EXAMINATION ORDERED AS PART OF A ROUTINE GENERAL MEDICAL EXAMINATION: ICD-10-CM

## 2024-02-22 LAB
ALBUMIN SERPL-MCNC: 4.1 G/DL (ref 3.4–5)
ALBUMIN/GLOB SERPL: 1.1 {RATIO} (ref 1–2)
ALP LIVER SERPL-CCNC: 71 U/L
ALT SERPL-CCNC: 44 U/L
ANION GAP SERPL CALC-SCNC: 3 MMOL/L (ref 0–18)
AST SERPL-CCNC: 24 U/L (ref 15–37)
BASOPHILS # BLD AUTO: 0.04 X10(3) UL (ref 0–0.2)
BASOPHILS NFR BLD AUTO: 0.6 %
BILIRUB SERPL-MCNC: 0.7 MG/DL (ref 0.1–2)
BILIRUB UR QL STRIP.AUTO: NEGATIVE
BUN BLD-MCNC: 17 MG/DL (ref 9–23)
CALCIUM BLD-MCNC: 9.6 MG/DL (ref 8.5–10.1)
CHLORIDE SERPL-SCNC: 105 MMOL/L (ref 98–112)
CHOLEST SERPL-MCNC: 186 MG/DL (ref ?–200)
CLARITY UR REFRACT.AUTO: CLEAR
CO2 SERPL-SCNC: 28 MMOL/L (ref 21–32)
CREAT BLD-MCNC: 0.83 MG/DL
EGFRCR SERPLBLD CKD-EPI 2021: 105 ML/MIN/1.73M2 (ref 60–?)
EOSINOPHIL # BLD AUTO: 0.13 X10(3) UL (ref 0–0.7)
EOSINOPHIL NFR BLD AUTO: 1.9 %
ERYTHROCYTE [DISTWIDTH] IN BLOOD BY AUTOMATED COUNT: 12.5 %
FASTING PATIENT LIPID ANSWER: YES
FASTING STATUS PATIENT QL REPORTED: YES
GLOBULIN PLAS-MCNC: 3.6 G/DL (ref 2.8–4.4)
GLUCOSE BLD-MCNC: 101 MG/DL (ref 70–99)
GLUCOSE UR STRIP.AUTO-MCNC: NORMAL MG/DL
HCT VFR BLD AUTO: 45.2 %
HDLC SERPL-MCNC: 54 MG/DL (ref 40–59)
HGB BLD-MCNC: 15.8 G/DL
IMM GRANULOCYTES # BLD AUTO: 0.01 X10(3) UL (ref 0–1)
IMM GRANULOCYTES NFR BLD: 0.1 %
KETONES UR STRIP.AUTO-MCNC: NEGATIVE MG/DL
LDLC SERPL CALC-MCNC: 105 MG/DL (ref ?–100)
LEUKOCYTE ESTERASE UR QL STRIP.AUTO: NEGATIVE
LYMPHOCYTES # BLD AUTO: 2.72 X10(3) UL (ref 1–4)
LYMPHOCYTES NFR BLD AUTO: 39.1 %
MCH RBC QN AUTO: 32.2 PG (ref 26–34)
MCHC RBC AUTO-ENTMCNC: 35 G/DL (ref 31–37)
MCV RBC AUTO: 92.2 FL
MONOCYTES # BLD AUTO: 0.56 X10(3) UL (ref 0.1–1)
MONOCYTES NFR BLD AUTO: 8.1 %
NEUTROPHILS # BLD AUTO: 3.49 X10 (3) UL (ref 1.5–7.7)
NEUTROPHILS # BLD AUTO: 3.49 X10(3) UL (ref 1.5–7.7)
NEUTROPHILS NFR BLD AUTO: 50.2 %
NITRITE UR QL STRIP.AUTO: NEGATIVE
NONHDLC SERPL-MCNC: 132 MG/DL (ref ?–130)
OSMOLALITY SERPL CALC.SUM OF ELEC: 284 MOSM/KG (ref 275–295)
PH UR STRIP.AUTO: 7 [PH] (ref 5–8)
PLATELET # BLD AUTO: 258 10(3)UL (ref 150–450)
POTASSIUM SERPL-SCNC: 4 MMOL/L (ref 3.5–5.1)
PROT SERPL-MCNC: 7.7 G/DL (ref 6.4–8.2)
PROT UR STRIP.AUTO-MCNC: NEGATIVE MG/DL
PSA SERPL-MCNC: 4.68 NG/ML (ref ?–4)
RBC # BLD AUTO: 4.9 X10(6)UL
RBC UR QL AUTO: NEGATIVE
SODIUM SERPL-SCNC: 136 MMOL/L (ref 136–145)
SP GR UR STRIP.AUTO: 1.01 (ref 1–1.03)
TRIGL SERPL-MCNC: 154 MG/DL (ref 30–149)
TSI SER-ACNC: 0.73 MIU/ML (ref 0.36–3.74)
UROBILINOGEN UR STRIP.AUTO-MCNC: NORMAL MG/DL
VLDLC SERPL CALC-MCNC: 26 MG/DL (ref 0–30)
WBC # BLD AUTO: 7 X10(3) UL (ref 4–11)

## 2024-02-22 PROCEDURE — 36415 COLL VENOUS BLD VENIPUNCTURE: CPT

## 2024-02-22 PROCEDURE — 84443 ASSAY THYROID STIM HORMONE: CPT

## 2024-02-22 PROCEDURE — 84153 ASSAY OF PSA TOTAL: CPT

## 2024-02-22 PROCEDURE — 85025 COMPLETE CBC W/AUTO DIFF WBC: CPT

## 2024-02-22 PROCEDURE — 81003 URINALYSIS AUTO W/O SCOPE: CPT

## 2024-02-22 PROCEDURE — 80061 LIPID PANEL: CPT

## 2024-02-22 PROCEDURE — 80053 COMPREHEN METABOLIC PANEL: CPT

## 2024-02-26 ENCOUNTER — OFFICE VISIT (OUTPATIENT)
Dept: FAMILY MEDICINE CLINIC | Facility: CLINIC | Age: 54
End: 2024-02-26
Payer: COMMERCIAL

## 2024-02-26 VITALS
HEART RATE: 86 BPM | TEMPERATURE: 97 F | WEIGHT: 261 LBS | BODY MASS INDEX: 32.45 KG/M2 | SYSTOLIC BLOOD PRESSURE: 118 MMHG | HEIGHT: 75 IN | RESPIRATION RATE: 14 BRPM | DIASTOLIC BLOOD PRESSURE: 76 MMHG

## 2024-02-26 DIAGNOSIS — R97.20 ELEVATED PSA MEASUREMENT: ICD-10-CM

## 2024-02-26 DIAGNOSIS — I10 ESSENTIAL HYPERTENSION: ICD-10-CM

## 2024-02-26 DIAGNOSIS — Z00.00 PHYSICAL EXAM, ANNUAL: Primary | ICD-10-CM

## 2024-02-26 DIAGNOSIS — E04.1 THYROID NODULE: ICD-10-CM

## 2024-02-26 PROCEDURE — 99396 PREV VISIT EST AGE 40-64: CPT | Performed by: FAMILY MEDICINE

## 2024-02-26 NOTE — PATIENT INSTRUCTIONS
Shingrix-shingles shot.   Healthy diet.  Stay active.   Call [phone] to schedule   Ultrasound of the thyroid.  Do blood work for electrolytes prior physical.

## 2024-02-26 NOTE — PROGRESS NOTES
Mallorie Matthews is a 54 year old male who presents for a complete physical exam.   HPI:   Pt taking lisinopril 10 mg daily for hypertension blood pressure is stable at home.  Denies any chest pain no shortness of breath no palpitations .    Patient had thyroid nodule on ultrasound in 2023 , check ultrasound later this year for stability of the nodule.      .  Immunization History   Administered Date(s) Administered    TD 07/17/2010    TDAP 03/07/2020     Wt Readings from Last 6 Encounters:   02/26/24 261 lb (118.4 kg)   12/18/23 259 lb (117.5 kg)   02/13/23 257 lb 6.4 oz (116.8 kg)   07/19/22 254 lb (115.2 kg)   05/04/22 253 lb 9.6 oz (115 kg)   04/18/22 256 lb (116.1 kg)     Body mass index is 32.62 kg/m².     Cholesterol, Total (mg/dL)   Date Value   02/22/2024 186   02/13/2023 174   03/04/2021 121     CHOLESTEROL, TOTAL (mg/dL)   Date Value   08/06/2011 172     HDL Cholesterol (mg/dL)   Date Value   02/22/2024 54   02/13/2023 62 (H)   03/04/2021 59     HDL CHOLESTEROL (mg/dL)   Date Value   08/06/2011 50     LDL Cholesterol (mg/dL)   Date Value   02/22/2024 105 (H)   02/13/2023 84   03/04/2021 46     LDL-CHOLESTEROL (mg/dL (calc))   Date Value   08/06/2011 92     AST (U/L)   Date Value   02/22/2024 24   02/13/2023 17   11/05/2021 21   08/06/2011 25     ALT (U/L)   Date Value   02/22/2024 44   02/13/2023 47   11/05/2021 59   08/06/2011 43      PSA (ng/mL)   Date Value   02/22/2024 4.68 (H)   06/19/2023 5.51 (H)   09/24/2016 1.170     No results found for: \"GLUCOSE\"    Current Outpatient Medications   Medication Sig Dispense Refill    lisinopril 10 MG Oral Tab Take 1 tablet (10 mg total) by mouth daily. Schedule follow-up appointment. 90 tablet 1    Sildenafil Citrate 100 MG Oral Tab Take 1 tablet (100 mg total) by mouth daily as needed for Erectile Dysfunction. Take ~ one hour prior to sexual activity on an empty stomach 30 tablet 5      Past Medical History:   Diagnosis Date    Arthritis     COVID 03/2021     Hospitalized for 5 days. SOB/Fever    Hepatitis A     High blood pressure     LBP (low back pain)       Past Surgical History:   Procedure Laterality Date    COLONOSCOPY N/A 11/8/2021    Procedure: COLONOSCOPY with forcep polypectomy;  Surgeon: Jean Newman MD;  Location:  ENDOSCOPY    COLONOSCOPY N/A 7/19/2022    Procedure: COLONOSCOPY;  Surgeon: Ricardo Jones MD;  Location:  ENDOSCOPY      Family History   Problem Relation Age of Onset    Other (accident) Father     Hypertension Mother     Other (benign htn) Other       Social History:  Social History     Socioeconomic History    Marital status:    Tobacco Use    Smoking status: Never    Smokeless tobacco: Never   Vaping Use    Vaping Use: Never used   Substance and Sexual Activity    Alcohol use: Yes     Alcohol/week: 0.0 - 2.0 standard drinks of alcohol     Comment: weekends    Drug use: No    Sexual activity: Yes   Other Topics Concern    Caffeine Concern Yes    Exercise Yes     Comment: 2-3 xs per wk      Occ:  : yes Children: 3.   Exercise: three times per week.  Diet: watches calories closely     REVIEW OF SYSTEMS:   GENERAL: feels well otherwise  SKIN: denies any unusual skin lesions  EYES:denies blurred vision or double vision  HEENT: denies nasal congestion, sinus pain or ST  LUNGS: denies shortness of breath with exertion  CARDIOVASCULAR: denies chest pain on exertion  GI: denies abdominal pain,denies heartburn  : denies nocturia or changes in stream  MUSCULOSKELETAL: denies back pain  NEURO: denies headaches  PSYCHE: denies depression or anxiety  HEMATOLOGIC: denies hx of anemia  ENDOCRINE: denies thyroid history  ALL/ASTHMA: denies hx of allergy or asthma    EXAM:   /76 (BP Location: Right arm, Patient Position: Sitting, Cuff Size: large)   Pulse 86   Temp 97.2 °F (36.2 °C) (Temporal)   Resp 14   Ht 6' 3\" (1.905 m)   Wt 261 lb (118.4 kg)   BMI 32.62 kg/m²   Body mass index is 32.62 kg/m².    GENERAL: well developed, well nourished,in no apparent distress  SKIN: no rashes,no suspicious lesions  HEENT: atraumatic, normocephalic,ears and throat are clear  EYES:PERRLA, EOMI, conjunctiva are clear  NECK: supple,no adenopathy  CHEST: no chest tenderness  BREAST: no dominant or suspicious mass  LUNGS: clear to auscultation  CARDIO: RRR without murmur  GI: good BS's,no masses, HSM or tenderness  : Done by urologist done by urologist  RECTAL:   MUSCULOSKELETAL: back is not tender,FROM of the back  EXTREMITIES: no cyanosis, clubbing or edema  NEURO: Oriented times three,cranial nerves are intact,motor and sensory are grossly intact    Results for orders placed or performed in visit on 02/22/24   Comp Metabolic Panel (14)   Result Value Ref Range    Glucose 101 (H) 70 - 99 mg/dL    Sodium 136 136 - 145 mmol/L    Potassium 4.0 3.5 - 5.1 mmol/L    Chloride 105 98 - 112 mmol/L    CO2 28.0 21.0 - 32.0 mmol/L    Anion Gap 3 0 - 18 mmol/L    BUN 17 9 - 23 mg/dL    Creatinine 0.83 0.70 - 1.30 mg/dL    Calcium, Total 9.6 8.5 - 10.1 mg/dL    Calculated Osmolality 284 275 - 295 mOsm/kg    eGFR-Cr 105 >=60 mL/min/1.73m2    AST 24 15 - 37 U/L    ALT 44 16 - 61 U/L    Alkaline Phosphatase 71 45 - 117 U/L    Bilirubin, Total 0.7 0.1 - 2.0 mg/dL    Total Protein 7.7 6.4 - 8.2 g/dL    Albumin 4.1 3.4 - 5.0 g/dL    Globulin  3.6 2.8 - 4.4 g/dL    A/G Ratio 1.1 1.0 - 2.0    Patient Fasting for CMP? Yes    Lipid Panel   Result Value Ref Range    Cholesterol, Total 186 <200 mg/dL    HDL Cholesterol 54 40 - 59 mg/dL    Triglycerides 154 (H) 30 - 149 mg/dL    LDL Cholesterol 105 (H) <100 mg/dL    VLDL 26 0 - 30 mg/dL    Non HDL Chol 132 (H) <130 mg/dL    Patient Fasting for Lipid? Yes    Urinalysis with Culture Reflex    Specimen: Urine, clean catch   Result Value Ref Range    Urine Color Light-Yellow Yellow    Clarity Urine Clear Clear    Spec Gravity 1.007 1.005 - 1.030    Glucose Urine Normal Normal mg/dL    Bilirubin Urine  Negative Negative    Ketones Urine Negative Negative mg/dL    Blood Urine Negative Negative    pH Urine 7.0 5.0 - 8.0    Protein Urine Negative Negative mg/dL    Urobilinogen Urine Normal Normal mg/dL    Nitrite Urine Negative Negative    Leukocyte Esterase Urine Negative Negative    Microscopic Microscopic not indicated    TSH W Reflex To Free T4   Result Value Ref Range    TSH 0.730 0.358 - 3.740 mIU/mL   PSA Total, Diagnostic   Result Value Ref Range    PSA 4.68 (H) <=4.00 ng/mL   CBC W/ DIFFERENTIAL   Result Value Ref Range    WBC 7.0 4.0 - 11.0 x10(3) uL    RBC 4.90 4.30 - 5.70 x10(6)uL    HGB 15.8 13.0 - 17.5 g/dL    HCT 45.2 39.0 - 53.0 %    .0 150.0 - 450.0 10(3)uL    MCV 92.2 80.0 - 100.0 fL    MCH 32.2 26.0 - 34.0 pg    MCHC 35.0 31.0 - 37.0 g/dL    RDW 12.5 %    Neutrophil Absolute Prelim 3.49 1.50 - 7.70 x10 (3) uL    Neutrophil Absolute 3.49 1.50 - 7.70 x10(3) uL    Lymphocyte Absolute 2.72 1.00 - 4.00 x10(3) uL    Monocyte Absolute 0.56 0.10 - 1.00 x10(3) uL    Eosinophil Absolute 0.13 0.00 - 0.70 x10(3) uL    Basophil Absolute 0.04 0.00 - 0.20 x10(3) uL    Immature Granulocyte Absolute 0.01 0.00 - 1.00 x10(3) uL    Neutrophil % 50.2 %    Lymphocyte % 39.1 %    Monocyte % 8.1 %    Eosinophil % 1.9 %    Basophil % 0.6 %    Immature Granulocyte % 0.1 %      ASSESSMENT AND PLAN:   Mallorie Matthews is a 54 year old male who presents for a complete physical exam.   Encounter Diagnoses   Name Primary?    Physical exam, annual Yes    Thyroid nodule     Essential hypertension     Elevated PSA measurement        Orders Placed This Encounter   Procedures    Comp Metabolic Panel (14)       Meds & Refills for this Visit:  Requested Prescriptions      No prescriptions requested or ordered in this encounter     Shingrix shingles vaccination.  Call [phone] to schedule  ultrasound thyroid.  Continue current meds.   Watch diet for fats and carbs.   Stay active.      Imaging & Consults:  UROLOGY - INTERNAL  US  THYROID (CPT=76536)   Pt's weight is Body mass index is 32.62 kg/m²., recommended low carb diet and aerobic exercise 30 minutes three times weekly. Health maintenance, will check fasting Lipids, CMP, CBC and PSA. Pt is UTD with screening colonoscopy. The patient indicates understanding of these issues and agrees to the plan.  The patient is asked to return for CPX in 1 year.  Med check August 2024.  The note was dictated using speech recognition software.  Accuracy and grammar in transcription may be subject to error.

## 2024-06-19 DIAGNOSIS — I10 ESSENTIAL HYPERTENSION: ICD-10-CM

## 2024-06-19 RX ORDER — LISINOPRIL 10 MG/1
10 TABLET ORAL DAILY
Qty: 90 TABLET | Refills: 0 | Status: SHIPPED | OUTPATIENT
Start: 2024-06-19

## 2024-06-19 NOTE — TELEPHONE ENCOUNTER
Requested Prescriptions     Pending Prescriptions Disp Refills    LISINOPRIL 10 MG Oral Tab [Pharmacy Med Name: LISINOPRIL 10 MG TABLET] 90 tablet 1     Sig: TAKE 1 TABLET (10 MG TOTAL) BY MOUTH DAILY. SCHEDULE FOLLOW-UP APPOINTMENT.     Last refill 12/18/23 #90 x 1   LOV 2/26/24  Future Appointments   Date Time Provider Department Center   8/26/2024 10:00 AM Elida Quintero MD [id number]       Hypertension Medications Protocol [id number]/2024 12:38 AM   Protocol Details CMP or BMP in past 12 months    Last BP reading less than 140/90    In person appointment or virtual visit in the past 12 mos or appointment in next 3 mos    EGFRCR or GFRNAA > 50

## 2024-08-26 ENCOUNTER — OFFICE VISIT (OUTPATIENT)
Dept: FAMILY MEDICINE CLINIC | Facility: CLINIC | Age: 54
End: 2024-08-26
Payer: COMMERCIAL

## 2024-08-26 ENCOUNTER — LAB ENCOUNTER (OUTPATIENT)
Dept: LAB | Age: 54
End: 2024-08-26
Attending: FAMILY MEDICINE
Payer: COMMERCIAL

## 2024-08-26 VITALS
WEIGHT: 256.81 LBS | SYSTOLIC BLOOD PRESSURE: 110 MMHG | DIASTOLIC BLOOD PRESSURE: 79 MMHG | BODY MASS INDEX: 31.93 KG/M2 | HEIGHT: 75 IN | HEART RATE: 82 BPM | RESPIRATION RATE: 18 BRPM | TEMPERATURE: 97 F

## 2024-08-26 DIAGNOSIS — R97.20 ELEVATED PSA MEASUREMENT: ICD-10-CM

## 2024-08-26 DIAGNOSIS — I10 ESSENTIAL HYPERTENSION: ICD-10-CM

## 2024-08-26 DIAGNOSIS — I10 ESSENTIAL HYPERTENSION: Primary | ICD-10-CM

## 2024-08-26 DIAGNOSIS — E66.09 CLASS 1 OBESITY DUE TO EXCESS CALORIES WITHOUT SERIOUS COMORBIDITY WITH BODY MASS INDEX (BMI) OF 32.0 TO 32.9 IN ADULT: ICD-10-CM

## 2024-08-26 LAB
ALBUMIN SERPL-MCNC: 4.6 G/DL (ref 3.2–4.8)
ALBUMIN/GLOB SERPL: 1.6 {RATIO} (ref 1–2)
ALP LIVER SERPL-CCNC: 84 U/L
ALT SERPL-CCNC: 32 U/L
ANION GAP SERPL CALC-SCNC: 5 MMOL/L (ref 0–18)
AST SERPL-CCNC: 23 U/L (ref ?–34)
BILIRUB SERPL-MCNC: 0.9 MG/DL (ref 0.3–1.2)
BUN BLD-MCNC: 15 MG/DL (ref 9–23)
CALCIUM BLD-MCNC: 9.8 MG/DL (ref 8.7–10.4)
CHLORIDE SERPL-SCNC: 105 MMOL/L (ref 98–112)
CO2 SERPL-SCNC: 28 MMOL/L (ref 21–32)
CREAT BLD-MCNC: 0.97 MG/DL
EGFRCR SERPLBLD CKD-EPI 2021: 93 ML/MIN/1.73M2 (ref 60–?)
FASTING STATUS PATIENT QL REPORTED: YES
GLOBULIN PLAS-MCNC: 2.9 G/DL (ref 2–3.5)
GLUCOSE BLD-MCNC: 100 MG/DL (ref 70–99)
OSMOLALITY SERPL CALC.SUM OF ELEC: 287 MOSM/KG (ref 275–295)
POTASSIUM SERPL-SCNC: 4.2 MMOL/L (ref 3.5–5.1)
PROT SERPL-MCNC: 7.5 G/DL (ref 5.7–8.2)
PSA SERPL-MCNC: 4.17 NG/ML (ref ?–4)
SODIUM SERPL-SCNC: 138 MMOL/L (ref 136–145)

## 2024-08-26 PROCEDURE — 84153 ASSAY OF PSA TOTAL: CPT

## 2024-08-26 PROCEDURE — 99214 OFFICE O/P EST MOD 30 MIN: CPT | Performed by: FAMILY MEDICINE

## 2024-08-26 PROCEDURE — 80053 COMPREHEN METABOLIC PANEL: CPT

## 2024-08-26 RX ORDER — LISINOPRIL 10 MG/1
10 TABLET ORAL DAILY
Qty: 90 TABLET | Refills: 1 | Status: SHIPPED | OUTPATIENT
Start: 2024-08-26

## 2024-08-26 NOTE — PATIENT INSTRUCTIONS
Continue current meds.   Watch diet for fats and carbs.   Stay active.    Some weight.  See urologist Dr. Anderson for evaluation.

## 2024-08-26 NOTE — PROGRESS NOTES
Mallorie Matthews is a 54 year old male.  Hypertension, elevated PSA  HPI:   Hypertension blood pressure stable taking allopurinol milligram daily doing well denies any chest pain or shortness of breath no palpitation needs refill.  Blood work will be done today for electrolytes blood sugar came back mildly elevated    Elevated PSA in February of this year patient was recommended to see urologist Dr. Anderson never set up an appointment strongly recommended to have the visit scheduled.  Will check PSA today.  Denies any urinary problems.    Obesity weight loss recommended, patient is a  walking for exercise.  He will try to decrease carbs.    Current Outpatient Medications   Medication Sig Dispense Refill    lisinopril 10 MG Oral Tab Take 1 tablet (10 mg total) by mouth daily. 90 tablet 1    Sildenafil Citrate 100 MG Oral Tab Take 1 tablet (100 mg total) by mouth daily as needed for Erectile Dysfunction. Take ~ one hour prior to sexual activity on an empty stomach 30 tablet 5      Past Medical History:    Arthritis    COVID    Hospitalized for 5 days. SOB/Fever    Hepatitis A    High blood pressure    LBP (low back pain)      Social History:  Social History     Socioeconomic History    Marital status:    Tobacco Use    Smoking status: Never    Smokeless tobacco: Never   Vaping Use    Vaping status: Never Used   Substance and Sexual Activity    Alcohol use: Yes     Alcohol/week: 0.0 - 2.0 standard drinks of alcohol     Comment: weekends    Drug use: No    Sexual activity: Yes   Other Topics Concern    Caffeine Concern Yes    Exercise Yes     Comment: 2-3 xs per wk     Social Determinants of Health      [facility]    Housing Stability        REVIEW OF SYSTEMS:   GENERAL HEALTH: feels well otherwise  SKIN: denies any unusual skin lesions or rashes  HEENT no congestion no runny nose no sore throat  Neck no neck pain   RESPIRATORY: denies  shortness of breath with exertion  CARDIOVASCULAR: denies chest pain on exertion  GI: denies abdominal pain and denies heartburn  NEURO: denies headaches  Psych normal mood.    EXAM:   /79 (BP Location: Right arm, Patient Position: Sitting, Cuff Size: adult)   Pulse 82   Temp 97.1 °F (36.2 °C) (Temporal)   Resp 18   Ht 6' 3\" (1.905 m)   Wt 256 lb 12.8 oz (116.5 kg)   BMI 32.10 kg/m²   GENERAL: well developed, well nourished,in no apparent distress  SKIN: no rashes,no suspicious lesions  HEENT: atraumatic, normocephalic,ears and throat are clear  NECK: supple,no adenopathy  LUNGS: clear to auscultation  CARDIO: RRR without murmur  GI: good BS's,no masses, HSM or tenderness  EXTREMITIES: no cyanosis, clubbing or edema  Psychiatric - alert  and oriented x3, normal mood      Results for orders placed or performed in visit on 02/22/24   Comp Metabolic Panel (14)   Result Value Ref Range    Glucose 101 (H) 70 - 99 mg/dL    Sodium 136 136 - 145 mmol/L    Potassium 4.0 3.5 - 5.1 mmol/L    Chloride 105 98 - 112 mmol/L    CO2 28.0 21.0 - 32.0 mmol/L    Anion Gap 3 0 - 18 mmol/L    BUN 17 9 - 23 mg/dL    Creatinine 0.83 0.70 - 1.30 mg/dL    Calcium, Total 9.6 8.5 - 10.1 mg/dL    Calculated Osmolality 284 275 - 295 mOsm/kg    eGFR-Cr 105 >=60 mL/min/1.73m2    AST 24 15 - 37 U/L    ALT 44 16 - 61 U/L    Alkaline Phosphatase 71 45 - 117 U/L    Bilirubin, Total 0.7 0.1 - 2.0 mg/dL    Total Protein 7.7 6.4 - 8.2 g/dL    Albumin 4.1 3.4 - 5.0 g/dL    Globulin  3.6 2.8 - 4.4 g/dL    A/G Ratio 1.1 1.0 - 2.0    Patient Fasting for CMP? Yes    Lipid Panel   Result Value Ref Range    Cholesterol, Total 186 <200 mg/dL    HDL Cholesterol 54 40 - 59 mg/dL    Triglycerides 154 (H) 30 - 149 mg/dL    LDL Cholesterol 105 (H) <100 mg/dL    VLDL 26 0 - 30 mg/dL    Non HDL Chol 132 (H) <130 mg/dL    Patient Fasting for Lipid? Yes    Urinalysis with Culture Reflex    Specimen: Urine, clean catch   Result Value Ref Range    Urine Color  Light-Yellow Yellow    Clarity Urine Clear Clear    Spec Gravity 1.007 1.005 - 1.030    Glucose Urine Normal Normal mg/dL    Bilirubin Urine Negative Negative    Ketones Urine Negative Negative mg/dL    Blood Urine Negative Negative    pH Urine 7.0 5.0 - 8.0    Protein Urine Negative Negative mg/dL    Urobilinogen Urine Normal Normal mg/dL    Nitrite Urine Negative Negative    Leukocyte Esterase Urine Negative Negative    Microscopic Microscopic not indicated    TSH W Reflex To Free T4   Result Value Ref Range    TSH 0.730 0.358 - 3.740 mIU/mL   PSA Total, Diagnostic   Result Value Ref Range    PSA 4.68 (H) <=4.00 ng/mL   CBC W/ DIFFERENTIAL   Result Value Ref Range    WBC 7.0 4.0 - 11.0 x10(3) uL    RBC 4.90 4.30 - 5.70 x10(6)uL    HGB 15.8 13.0 - 17.5 g/dL    HCT 45.2 39.0 - 53.0 %    .0 150.0 - 450.0 10(3)uL    MCV 92.2 80.0 - 100.0 fL    MCH 32.2 26.0 - 34.0 pg    MCHC 35.0 31.0 - 37.0 g/dL    RDW 12.5 %    Neutrophil Absolute Prelim 3.49 1.50 - 7.70 x10 (3) uL    Neutrophil Absolute 3.49 1.50 - 7.70 x10(3) uL    Lymphocyte Absolute 2.72 1.00 - 4.00 x10(3) uL    Monocyte Absolute 0.56 0.10 - 1.00 x10(3) uL    Eosinophil Absolute 0.13 0.00 - 0.70 x10(3) uL    Basophil Absolute 0.04 0.00 - 0.20 x10(3) uL    Immature Granulocyte Absolute 0.01 0.00 - 1.00 x10(3) uL    Neutrophil % 50.2 %    Lymphocyte % 39.1 %    Monocyte % 8.1 %    Eosinophil % 1.9 %    Basophil % 0.6 %    Immature Granulocyte % 0.1 %      ASSESSMENT AND PLAN:     Encounter Diagnoses   Name Primary?    Essential hypertension Yes    Elevated PSA measurement     Class 1 obesity due to excess calories without serious comorbidity with body mass index (BMI) of 32.0 to 32.9 in adult        Orders Placed This Encounter   Procedures    Comp Metabolic Panel (14)    PSA, Total - Diagnostic [E]       Meds & Refills for this Visit:  Requested Prescriptions     Signed Prescriptions Disp Refills    lisinopril 10 MG Oral Tab 90 tablet 1     Sig: Take 1  tablet (10 mg total) by mouth daily.   Continue current meds.   Watch diet for fats and carbs.   Stay active.    Some weight.  See urologist Dr. Anderson for evaluation.      Recommended to urology for elevated PSA.  He understands risk of missing prostate cancer by not doing additional evaluation.  Imaging & Consults:  None    The patient indicates understanding of these issues and agrees to the plan.  The patient is asked to return in 6 mo  for physical.The note was dictated using speech recognition software.  Accuracy and grammar in transcription may be subject to error.

## 2025-01-03 DIAGNOSIS — I10 ESSENTIAL HYPERTENSION: ICD-10-CM

## 2025-01-06 RX ORDER — LISINOPRIL 10 MG/1
10 TABLET ORAL DAILY
Qty: 90 TABLET | Refills: 0 | Status: SHIPPED | OUTPATIENT
Start: 2025-01-06

## 2025-01-06 NOTE — TELEPHONE ENCOUNTER
LOV: 8/26/2024  for: Medication Follow-Up   Patient advised to RTC on:  6 mo for physical.   Nov:03/03/2025    Medication Quantity Refills Start End   lisinopril 10 MG Oral Tab 90 tablet 1 8/26/2024 --   Sig:   Take 1 tablet (10 mg total) by mouth daily.

## 2025-03-03 ENCOUNTER — OFFICE VISIT (OUTPATIENT)
Dept: FAMILY MEDICINE CLINIC | Facility: CLINIC | Age: 55
End: 2025-03-03
Payer: COMMERCIAL

## 2025-03-03 ENCOUNTER — LAB ENCOUNTER (OUTPATIENT)
Dept: LAB | Age: 55
End: 2025-03-03
Attending: FAMILY MEDICINE
Payer: COMMERCIAL

## 2025-03-03 VITALS
SYSTOLIC BLOOD PRESSURE: 128 MMHG | WEIGHT: 260 LBS | BODY MASS INDEX: 32.33 KG/M2 | TEMPERATURE: 97 F | HEART RATE: 68 BPM | HEIGHT: 75 IN | DIASTOLIC BLOOD PRESSURE: 84 MMHG | RESPIRATION RATE: 16 BRPM

## 2025-03-03 DIAGNOSIS — Z00.00 LABORATORY EXAMINATION ORDERED AS PART OF A ROUTINE GENERAL MEDICAL EXAMINATION: ICD-10-CM

## 2025-03-03 DIAGNOSIS — Z00.00 PHYSICAL EXAM, ANNUAL: Primary | ICD-10-CM

## 2025-03-03 DIAGNOSIS — Z00.00 PHYSICAL EXAM, ANNUAL: ICD-10-CM

## 2025-03-03 DIAGNOSIS — R97.20 ELEVATED PSA MEASUREMENT: ICD-10-CM

## 2025-03-03 DIAGNOSIS — Z13.89 SCREENING FOR GENITOURINARY CONDITION: ICD-10-CM

## 2025-03-03 DIAGNOSIS — E04.1 THYROID NODULE: ICD-10-CM

## 2025-03-03 LAB
ALBUMIN SERPL-MCNC: 4.8 G/DL (ref 3.2–4.8)
ALBUMIN/GLOB SERPL: 1.7 {RATIO} (ref 1–2)
ALP LIVER SERPL-CCNC: 80 U/L
ALT SERPL-CCNC: 37 U/L
ANION GAP SERPL CALC-SCNC: 11 MMOL/L (ref 0–18)
AST SERPL-CCNC: 23 U/L (ref ?–34)
BASOPHILS # BLD AUTO: 0.04 X10(3) UL (ref 0–0.2)
BASOPHILS NFR BLD AUTO: 0.6 %
BILIRUB SERPL-MCNC: 0.8 MG/DL (ref 0.3–1.2)
BILIRUB UR QL STRIP.AUTO: NEGATIVE
BUN BLD-MCNC: 18 MG/DL (ref 9–23)
CALCIUM BLD-MCNC: 9.3 MG/DL (ref 8.7–10.6)
CHLORIDE SERPL-SCNC: 103 MMOL/L (ref 98–112)
CHOLEST SERPL-MCNC: 154 MG/DL (ref ?–200)
CO2 SERPL-SCNC: 26 MMOL/L (ref 21–32)
CREAT BLD-MCNC: 0.94 MG/DL
EGFRCR SERPLBLD CKD-EPI 2021: 96 ML/MIN/1.73M2 (ref 60–?)
EOSINOPHIL # BLD AUTO: 0.12 X10(3) UL (ref 0–0.7)
EOSINOPHIL NFR BLD AUTO: 1.7 %
ERYTHROCYTE [DISTWIDTH] IN BLOOD BY AUTOMATED COUNT: 12.5 %
FASTING PATIENT LIPID ANSWER: YES
FASTING STATUS PATIENT QL REPORTED: YES
GLOBULIN PLAS-MCNC: 2.9 G/DL (ref 2–3.5)
GLUCOSE BLD-MCNC: 86 MG/DL (ref 70–99)
GLUCOSE UR STRIP.AUTO-MCNC: NORMAL MG/DL
HCT VFR BLD AUTO: 45.7 %
HDLC SERPL-MCNC: 48 MG/DL (ref 40–59)
HGB BLD-MCNC: 16 G/DL
IMM GRANULOCYTES # BLD AUTO: 0.01 X10(3) UL (ref 0–1)
IMM GRANULOCYTES NFR BLD: 0.1 %
KETONES UR STRIP.AUTO-MCNC: NEGATIVE MG/DL
LDLC SERPL CALC-MCNC: 88 MG/DL (ref ?–100)
LEUKOCYTE ESTERASE UR QL STRIP.AUTO: NEGATIVE
LYMPHOCYTES # BLD AUTO: 2.53 X10(3) UL (ref 1–4)
LYMPHOCYTES NFR BLD AUTO: 36.5 %
MCH RBC QN AUTO: 31.9 PG (ref 26–34)
MCHC RBC AUTO-ENTMCNC: 35 G/DL (ref 31–37)
MCV RBC AUTO: 91 FL
MONOCYTES # BLD AUTO: 0.54 X10(3) UL (ref 0.1–1)
MONOCYTES NFR BLD AUTO: 7.8 %
NEUTROPHILS # BLD AUTO: 3.69 X10 (3) UL (ref 1.5–7.7)
NEUTROPHILS # BLD AUTO: 3.69 X10(3) UL (ref 1.5–7.7)
NEUTROPHILS NFR BLD AUTO: 53.3 %
NITRITE UR QL STRIP.AUTO: NEGATIVE
NONHDLC SERPL-MCNC: 106 MG/DL (ref ?–130)
OSMOLALITY SERPL CALC.SUM OF ELEC: 291 MOSM/KG (ref 275–295)
PH UR STRIP.AUTO: 7 [PH] (ref 5–8)
PLATELET # BLD AUTO: 274 10(3)UL (ref 150–450)
POTASSIUM SERPL-SCNC: 4 MMOL/L (ref 3.5–5.1)
PROT SERPL-MCNC: 7.7 G/DL (ref 5.7–8.2)
PROT UR STRIP.AUTO-MCNC: NEGATIVE MG/DL
PSA SERPL-MCNC: 2.85 NG/ML (ref ?–4)
RBC # BLD AUTO: 5.02 X10(6)UL
RBC UR QL AUTO: NEGATIVE
SODIUM SERPL-SCNC: 140 MMOL/L (ref 136–145)
SP GR UR STRIP.AUTO: 1.01 (ref 1–1.03)
TRIGL SERPL-MCNC: 99 MG/DL (ref 30–149)
TSI SER-ACNC: 0.75 UIU/ML (ref 0.55–4.78)
UROBILINOGEN UR STRIP.AUTO-MCNC: NORMAL MG/DL
VLDLC SERPL CALC-MCNC: 16 MG/DL (ref 0–30)
WBC # BLD AUTO: 6.9 X10(3) UL (ref 4–11)

## 2025-03-03 PROCEDURE — 80053 COMPREHEN METABOLIC PANEL: CPT

## 2025-03-03 PROCEDURE — 81001 URINALYSIS AUTO W/SCOPE: CPT

## 2025-03-03 PROCEDURE — 36415 COLL VENOUS BLD VENIPUNCTURE: CPT

## 2025-03-03 PROCEDURE — 99396 PREV VISIT EST AGE 40-64: CPT | Performed by: FAMILY MEDICINE

## 2025-03-03 PROCEDURE — 84443 ASSAY THYROID STIM HORMONE: CPT

## 2025-03-03 PROCEDURE — 85025 COMPLETE CBC W/AUTO DIFF WBC: CPT

## 2025-03-03 PROCEDURE — 84153 ASSAY OF PSA TOTAL: CPT

## 2025-03-03 PROCEDURE — 80061 LIPID PANEL: CPT

## 2025-03-03 NOTE — PROGRESS NOTES
Mallorie Matthews is a 55 year old male who presents for a complete physical exam.   HPI:   Pt taking lisinopril 10 mg daily for hypertension blood pressure is stable at home.  Denies any chest pain no shortness of breath no palpitations .    Patient had thyroid nodule on ultrasound in 2023 , check ultrasound later this year for stability of the nodule.  Ultrasound thyroid was not done last year as recommended.    .  Patient elevated PSA will check blood work today.  Recommended follow-up with urologist.    Immunization History   Administered Date(s) Administered    TD 07/17/2010    TDAP 03/07/2020     Wt Readings from Last 6 Encounters:   03/03/25 260 lb (117.9 kg)   08/26/24 256 lb 12.8 oz (116.5 kg)   02/26/24 261 lb (118.4 kg)   12/18/23 259 lb (117.5 kg)   02/13/23 257 lb 6.4 oz (116.8 kg)   07/19/22 254 lb (115.2 kg)     Body mass index is 32.5 kg/m².     Cholesterol, Total (mg/dL)   Date Value   02/22/2024 186   02/13/2023 174   03/04/2021 121     CHOLESTEROL, TOTAL (mg/dL)   Date Value   08/06/2011 172     HDL Cholesterol (mg/dL)   Date Value   02/22/2024 54   02/13/2023 62 (H)   03/04/2021 59     HDL CHOLESTEROL (mg/dL)   Date Value   08/06/2011 50     LDL Cholesterol (mg/dL)   Date Value   02/22/2024 105 (H)   02/13/2023 84   03/04/2021 46     LDL-CHOLESTEROL (mg/dL (calc))   Date Value   08/06/2011 92     AST (U/L)   Date Value   08/26/2024 23   02/22/2024 24   02/13/2023 17   08/06/2011 25     ALT (U/L)   Date Value   08/26/2024 32   02/22/2024 44   02/13/2023 47   08/06/2011 43      PSA (ng/mL)   Date Value   02/22/2024 4.68 (H)   06/19/2023 5.51 (H)   09/24/2016 1.170     Total PSA  (ng/mL)   Date Value   08/26/2024 4.17 (H)     No results found for: \"GLUCOSE\"    Current Outpatient Medications   Medication Sig Dispense Refill    lisinopril 10 MG Oral Tab Take 1 tablet (10 mg total) by mouth daily. 90 tablet 0    Sildenafil Citrate 100 MG Oral Tab Take 1 tablet (100 mg total) by mouth daily as needed for  Erectile Dysfunction. Take ~ one hour prior to sexual activity on an empty stomach 30 tablet 5      Past Medical History:    Arthritis    COVID    Hospitalized for 5 days. SOB/Fever    Hepatitis A    High blood pressure    LBP (low back pain)      Past Surgical History:   Procedure Laterality Date    Colonoscopy N/A 11/8/2021    Procedure: COLONOSCOPY with forcep polypectomy;  Surgeon: Jean Newman MD;  Location:  ENDOSCOPY    Colonoscopy N/A 7/19/2022    Procedure: COLONOSCOPY;  Surgeon: Ricardo Jones MD;  Location:  ENDOSCOPY      Family History   Problem Relation Age of Onset    Other (accident) Father     Hypertension Mother     Other (benign htn) Other       Social History:  Social History     Socioeconomic History    Marital status:    Tobacco Use    Smoking status: Never    Smokeless tobacco: Never   Vaping Use    Vaping status: Never Used   Substance and Sexual Activity    Alcohol use: Yes     Alcohol/week: 0.0 - 2.0 standard drinks of alcohol     Comment: weekends    Drug use: No    Sexual activity: Yes   Other Topics Concern    Caffeine Concern Yes    Exercise Yes     Comment: 2-3 xs per wk     Social Drivers of Health     Food Insecurity: No Food Insecurity (3/3/2025)    NCSS - Food Insecurity     Worried About Running Out of Food in the Last Year: No     Ran Out of Food in the Last Year: No   Transportation Needs: No Transportation Needs (3/3/2025)    NCSS - Transportation     Lack of Transportation: No   Housing Stability: Not At Risk (3/3/2025)    NCSS - Housing/Utilities     Has Housing: Yes     Worried About Losing Housing: No     Unable to Get Utilities: No      Occ:  : yes Children: 3.   Exercise: three times per week.  Diet: watches calories closely     REVIEW OF SYSTEMS:   GENERAL: feels well otherwise  SKIN: denies any unusual skin lesions  EYES:denies blurred vision or double vision  HEENT: denies nasal congestion, sinus pain or ST  LUNGS: denies  shortness of breath with exertion  CARDIOVASCULAR: denies chest pain on exertion  GI: denies abdominal pain,denies heartburn  : denies nocturia or changes in stream  MUSCULOSKELETAL: denies back pain  NEURO: denies headaches  PSYCHE: denies depression or anxiety  HEMATOLOGIC: denies hx of anemia  ENDOCRINE: denies thyroid history  ALL/ASTHMA: denies hx of allergy or asthma    EXAM:   /84 (BP Location: Left arm, Patient Position: Sitting, Cuff Size: adult)   Pulse 68   Temp 97 °F (36.1 °C) (Temporal)   Resp 16   Ht 6' 3\" (1.905 m)   Wt 260 lb (117.9 kg)   BMI 32.50 kg/m²   Body mass index is 32.5 kg/m².   GENERAL: well developed, well nourished,in no apparent distress  SKIN: no rashes,no suspicious lesions  HEENT: atraumatic, normocephalic,ears and throat are clear  EYES:PERRLA, EOMI, conjunctiva are clear  NECK: supple,no adenopathy  CHEST: no chest tenderness  BREAST: no dominant or suspicious mass  LUNGS: clear to auscultation  CARDIO: RRR without murmur  GI: good BS's,no masses, HSM or tenderness  : Done by urologist done by urologist  RECTAL:   MUSCULOSKELETAL: back is not tender,FROM of the back  EXTREMITIES: no cyanosis, clubbing or edema  NEURO: Oriented times three,cranial nerves are intact,motor and sensory are grossly intact    Results for orders placed or performed in visit on 08/26/24   Comp Metabolic Panel (14)    Collection Time: 08/26/24 10:41 AM   Result Value Ref Range    Glucose 100 (H) 70 - 99 mg/dL    Sodium 138 136 - 145 mmol/L    Potassium 4.2 3.5 - 5.1 mmol/L    Chloride 105 98 - 112 mmol/L    CO2 28.0 21.0 - 32.0 mmol/L    Anion Gap 5 0 - 18 mmol/L    BUN 15 9 - 23 mg/dL    Creatinine 0.97 0.70 - 1.30 mg/dL    Calcium, Total 9.8 8.7 - 10.4 mg/dL    Calculated Osmolality 287 275 - 295 mOsm/kg    eGFR-Cr 93 >=60 mL/min/1.73m2    AST 23 <34 U/L    ALT 32 10 - 49 U/L    Alkaline Phosphatase 84 45 - 117 U/L    Bilirubin, Total 0.9 0.3 - 1.2 mg/dL    Total Protein 7.5 5.7 - 8.2 g/dL     Albumin 4.6 3.2 - 4.8 g/dL    Globulin  2.9 2.0 - 3.5 g/dL    A/G Ratio 1.6 1.0 - 2.0    Patient Fasting for CMP? Yes    PSA, Total - Diagnostic [E]    Collection Time: 08/26/24 10:41 AM   Result Value Ref Range    Total PSA  4.17 (H) <=4.00 ng/mL      ASSESSMENT AND PLAN:   Mallorie Matthews is a 55 year old male who presents for a complete physical exam.   Encounter Diagnoses   Name Primary?    Physical exam, annual Yes    Laboratory examination ordered as part of a routine general medical examination     Screening for genitourinary condition     Elevated PSA measurement     Thyroid nodule        Orders Placed This Encounter   Procedures    CBC With Differential With Platelet    Comp Metabolic Panel (14)    Lipid Panel    Urinalysis with Culture Reflex    TSH W Reflex To Free T4    PSA Total, Diagnostic       Meds & Refills for this Visit:  Requested Prescriptions      No prescriptions requested or ordered in this encounter     Shingrix-shingles shot.   Prevnar 20 - pneumonia shot.   Healthy diet.  Stay active.   Call [phone] to schedule ultrasound of the thyroid.  See urologist Dr. Anderson for evaluation.      Medication check August 2025.    Imaging & Consults:  UROLOGY - INTERNAL  US THYROID (CPT=76536)   Pt's weight is Body mass index is 32.5 kg/m²., recommended low carb diet and aerobic exercise 30 minutes three times weekly. Health maintenance, will check fasting Lipids, CMP, CBC and PSA. Pt is UTD with screening colonoscopy. The patient indicates understanding of these issues and agrees to the plan.  The patient is asked to return for CPX in 1 year.  Med check August 2025.  The note was dictated using speech recognition software.  Accuracy and grammar in transcription may be subject to error.

## 2025-03-03 NOTE — PATIENT INSTRUCTIONS
Shingrix-shingles shot.   Prevnar 20 - pneumonia shot.   Healthy diet.  Stay active.   Call [phone] to schedule ultrasound of the thyroid.  See urologist Dr. Anderson for evaluation.      Medication check August 2025.        Refill policies:      Allow 3 business days for refills; controlled substances may take longer.  Contact your pharmacy at least 5-7 business days prior to running out of medication and have them send an electronic request or submit through the \"request refill\" option thru your [employer] account. No need to do both, as multiple requests will create an automated [employer] message to notify of a denial for one of the duplicated requests, causing you undue confusion.   Refills are NOT addressed on weekends; covering physicians do not authorize routine medications on weekends.  No narcotics or controlled substances are refilled after noon on Fridays or by on call physicians.  By law, narcotics cannot be faxed or phoned into your pharmacy.  If your prescription is due for a refill, you may be due for a follow up appointment. Please call our office at [phone] to make an appointment or schedule an appointment via [employer].  To best provide you care, patients receiving routine medications need to be seen at least twice a year. Patients receiving narcotic/controlled substance medications need to be seen at least once every 3 months.  In the event that your preferred pharmacy does not have the requested medication in stock (ie Backordered), it is your responsibility to find another pharmacy that has the requested medication available. We will gladly send a new prescription to that pharmacy at your request.  controlled substances may not be able to be filled out of state due to license restrictions.  If you have a planned trip, it's best to call your pharmacy at least 5-7 business days to prevent any delays in your medication refill.    Scheduling Tests:    If your physician has ordered radiology tests  such as MRI or CT scans, please contact Central Scheduling at [phone] right away to schedule the test.  Once scheduled, the [facility] Centralized Referral Team will work with your insurance carrier to obtain pre-certification or prior authorization.  Depending on your insurance carrier, approval may take 3-10 days.  It is highly recommended patients assure they have received an authorization before having a test performed.  If test is done without insurance authorization, patient may be responsible for the entire amount billed.      Precertification and Prior Authorizations:  If your physician has recommended that you have a procedure or additional testing performed the [facility] Centralized Referral Team will contact your insurance carrier to obtain pre-certification or prior authorization.    You are strongly encouraged to contact your insurance carrier to verify that your procedure/test has been approved and is a COVERED benefit.  Although the [facility] Centralized Referral Team does its due diligence, the insurance carrier gives the disclaimer that \"Although the procedure is authorized, this does not guarantee payment.\"    Ultimately the patient is responsible for payment.   Thank you for your understanding in this matter.  Paperwork Completion:  If you require FMLA or disability paperwork for your recovery, please make sure to either drop it off or have it faxed to our office at [phone]. Be sure the form has your name and date of birth on it.  The form will be faxed to our Forms Department and they will complete it for you.  There is a 25$ fee for all forms that need to be filled out.  Please be aware there is a 10-14 day turnaround time.  You will need to sign a release of information (FARHAD) form if your paperwork does not come with one.  You may call the Forms Department with any questions at [phone].  Their fax number is [phone].

## 2025-04-27 ENCOUNTER — OFFICE VISIT (OUTPATIENT)
Dept: FAMILY MEDICINE CLINIC | Facility: CLINIC | Age: 55
End: 2025-04-27
Payer: COMMERCIAL

## 2025-04-27 VITALS
HEART RATE: 76 BPM | RESPIRATION RATE: 18 BRPM | WEIGHT: 260 LBS | BODY MASS INDEX: 32.33 KG/M2 | SYSTOLIC BLOOD PRESSURE: 162 MMHG | HEIGHT: 75 IN | DIASTOLIC BLOOD PRESSURE: 72 MMHG | OXYGEN SATURATION: 97 %

## 2025-04-27 DIAGNOSIS — R03.0 ELEVATED BLOOD PRESSURE READING: Primary | ICD-10-CM

## 2025-04-27 NOTE — PROGRESS NOTES
Daughter took BP at home, elevated 180/70, their home manual one read 121/76  No symptoms.   Take lisinopril daily.     Manual 162/72. Recommend getting new BP machine as this current one over 10 years old.   Monitor at home. BP log. F/u with PCP.

## 2025-04-30 ENCOUNTER — PATIENT MESSAGE (OUTPATIENT)
Dept: FAMILY MEDICINE CLINIC | Facility: CLINIC | Age: 55
End: 2025-04-30

## 2025-04-30 NOTE — TELEPHONE ENCOUNTER
Blood pressure given from the patient in the [employer] message has been added into the chart under \"patient reported vitals\".

## 2025-05-01 ENCOUNTER — TELEPHONE (OUTPATIENT)
Dept: FAMILY MEDICINE CLINIC | Facility: CLINIC | Age: 55
End: 2025-05-01

## 2025-05-01 NOTE — TELEPHONE ENCOUNTER
Please check if patient could come to the office this Friday at 9:30 AM.  Bring his blood  pressure machine from home.  Thank you

## 2025-05-01 NOTE — TELEPHONE ENCOUNTER
Called patient. Patient is out of state at the moment and will only be available on Wednesday, 05/07/25 at 2 pm or later. Please advise.

## 2025-05-01 NOTE — TELEPHONE ENCOUNTER
Called patient and advised that Dr Quintero can see him on Wednesday 5 /7 at 4:45 PM.     Patient is scheduled on above date. I advised patient to bring his blood pressure machine for the visit.

## 2025-05-01 NOTE — TELEPHONE ENCOUNTER
Per MD: Please check if he could come this Friday at 9:30 AM. Bring blood pressure machine for the visit and readings.     Spoke with patient who advised they are working and cannot come in 5/2

## 2025-05-07 ENCOUNTER — OFFICE VISIT (OUTPATIENT)
Dept: FAMILY MEDICINE CLINIC | Facility: CLINIC | Age: 55
End: 2025-05-07
Payer: COMMERCIAL

## 2025-05-07 VITALS
HEART RATE: 68 BPM | TEMPERATURE: 97 F | RESPIRATION RATE: 16 BRPM | HEIGHT: 75 IN | DIASTOLIC BLOOD PRESSURE: 82 MMHG | BODY MASS INDEX: 32.66 KG/M2 | WEIGHT: 262.63 LBS | SYSTOLIC BLOOD PRESSURE: 122 MMHG

## 2025-05-07 DIAGNOSIS — E66.811 CLASS 1 OBESITY DUE TO EXCESS CALORIES WITHOUT SERIOUS COMORBIDITY WITH BODY MASS INDEX (BMI) OF 32.0 TO 32.9 IN ADULT: ICD-10-CM

## 2025-05-07 DIAGNOSIS — E66.09 CLASS 1 OBESITY DUE TO EXCESS CALORIES WITHOUT SERIOUS COMORBIDITY WITH BODY MASS INDEX (BMI) OF 32.0 TO 32.9 IN ADULT: ICD-10-CM

## 2025-05-07 DIAGNOSIS — I10 ESSENTIAL HYPERTENSION: Primary | ICD-10-CM

## 2025-05-07 PROCEDURE — 99214 OFFICE O/P EST MOD 30 MIN: CPT | Performed by: FAMILY MEDICINE

## 2025-05-07 RX ORDER — LISINOPRIL 10 MG/1
10 TABLET ORAL DAILY
Qty: 90 TABLET | Refills: 1 | Status: SHIPPED | OUTPATIENT
Start: 2025-05-07

## 2025-05-07 RX ORDER — LISINOPRIL 5 MG/1
5 TABLET ORAL DAILY
Qty: 90 TABLET | Refills: 0 | Status: SHIPPED | OUTPATIENT
Start: 2025-05-07

## 2025-05-07 RX ORDER — SILDENAFIL 100 MG/1
100 TABLET, FILM COATED ORAL
Qty: 30 TABLET | Refills: 0 | Status: CANCELLED | OUTPATIENT
Start: 2025-05-07

## 2025-05-07 NOTE — PATIENT INSTRUCTIONS
Take lisinopril 10 mg tablet and 5 mg tablet at the same time.  Monitor blood pressure at home.  Consider getting new blood pressure machine.  Low-salt diet.  Check blood work for electrolytes few days prior next visit.  Schedule appointment in 1 month bring blood pressure readings for the visit.      VISIT SUMMARY:  Today, we discussed your recent elevated blood pressure readings. You have been on 10 mg of lisinopril daily for hypertension, but your recent readings suggest that this dosage may no longer be sufficient. We reviewed your home monitoring process and discussed potential inaccuracies with your current blood pressure monitor.    YOUR PLAN:  -HYPERTENSION: Hypertension, or high blood pressure, means that the force of the blood against your artery walls is too high, which can lead to health problems. We will increase your lisinopril dosage to 15 mg daily to better control your blood pressure. Additionally, you should obtain a reliable blood pressure monitor, preferably an Omron brand, and monitor your blood pressure regularly. If your blood pressure exceeds 140 mmHg, take an additional 10 mg of lisinopril and recheck it in 2 hours. We also discussed lifestyle changes such as reducing salt intake, increasing physical activity, and managing your weight to help control your blood pressure. Consider getting a heart scan for a more comprehensive cardiovascular assessment.    INSTRUCTIONS:  Please schedule a follow-up appointment in one month or after your trip to [facility]. Bring your blood pressure readings to this appointment.    Contains text generated by [employer]

## 2025-05-07 NOTE — PROGRESS NOTES
Mallorie Matthews is a 55 year old male.  HPI:     The following individual(s) verbally consented to be recorded using ambient AI listening technology and understand that they can each withdraw their consent to this listening technology at any point by asking the clinician to turn off or pause the recording:    Patient name: Mallorie Matthews      History of Present Illness  Mallorie Matthews is a 55 year old male with hypertension who presents with elevated blood pressure readings.    He has a history of hypertension and is currently taking 10 mg of lisinopril daily, which he has been on for approximately four years. Recently, he has experienced elevated blood pressure readings, initially noted during a medical examination for his 's license. The readings were 158/90 mmHg, and subsequent home measurements showed similar elevations, with readings as high as 162/72 mmHg.    He mentions that his home blood pressure monitor, which is about ten years old, may be inaccurate. He has tried using a new cuff on the old machine, but it still shows high readings. His daughter also checked his blood pressure with a manual device, which also indicated high readings.    No chest pain, palpitations, or shortness of breath. He occasionally feels pressure in his neck, which he attributes to stress. He is concerned about his blood pressure readings affecting his ability to pass the 's license medical examination.    In terms of family history, his mother also has hypertension and is on medication, possibly lisinopril. An uncle on his mother's side had a heart attack, but there is no other significant family history of heart disease or stroke.     Current Medications[1]   Past Medical History[2]   Social History:  Short Social Hx on File[3]     REVIEW OF SYSTEMS:   GENERAL HEALTH: feels well otherwise  SKIN: denies any unusual skin lesions or rashes  HEENT no congestion no runny nose no sore throat  Neck no neck pain   RESPIRATORY:  denies shortness of breath with exertion  CARDIOVASCULAR: denies chest pain on exertion  GI: denies abdominal pain and denies heartburn  NEURO: denies headaches  Psych normal mood  EXAM:   /82 (BP Location: Left arm, Patient Position: Sitting, Cuff Size: large)   Pulse 68   Temp 97 °F (36.1 °C) (Temporal)   Resp 16   Ht 6' 3\" (1.905 m)   Wt 262 lb 9.6 oz (119.1 kg)   BMI 32.82 kg/m²   GENERAL: well developed, well nourished,in no apparent distress  SKIN: no rashes,no suspicious lesions  HEENT: atraumatic, normocephalic,ears and throat are clear  NECK: supple,no adenopathy,  LUNGS: clear to auscultation  CARDIO: RRR without murmur  GI: good BS's,no masses, HSM or tenderness  EXTREMITIES: no cyanosis, clubbing or edema  Psychiatric - alert  and oriented x3, normal mood      Results for orders placed or performed in visit on 03/03/25   PSA Total, Diagnostic    Collection Time: 03/03/25 10:47 AM   Result Value Ref Range    Total PSA  2.85 <=4.00 ng/mL   CBC With Differential With Platelet    Collection Time: 03/03/25 10:47 AM   Result Value Ref Range    WBC 6.9 4.0 - 11.0 x10(3) uL    RBC 5.02 4.30 - 5.70 x10(6)uL    HGB 16.0 13.0 - 17.5 g/dL    HCT 45.7 39.0 - 53.0 %    .0 150.0 - 450.0 10(3)uL    MCV 91.0 80.0 - 100.0 fL    MCH 31.9 26.0 - 34.0 pg    MCHC 35.0 31.0 - 37.0 g/dL    RDW 12.5 %    Neutrophil Absolute Prelim 3.69 1.50 - 7.70 x10 (3) uL    Neutrophil Absolute 3.69 1.50 - 7.70 x10(3) uL    Lymphocyte Absolute 2.53 1.00 - 4.00 x10(3) uL    Monocyte Absolute 0.54 0.10 - 1.00 x10(3) uL    Eosinophil Absolute 0.12 0.00 - 0.70 x10(3) uL    Basophil Absolute 0.04 0.00 - 0.20 x10(3) uL    Immature Granulocyte Absolute 0.01 0.00 - 1.00 x10(3) uL    Neutrophil % 53.3 %    Lymphocyte % 36.5 %    Monocyte % 7.8 %    Eosinophil % 1.7 %    Basophil % 0.6 %    Immature Granulocyte % 0.1 %   Comp Metabolic Panel (14)    Collection Time: 03/03/25 10:47 AM   Result Value Ref Range    Glucose 86 70 - 99  mg/dL    Sodium 140 136 - 145 mmol/L    Potassium 4.0 3.5 - 5.1 mmol/L    Chloride 103 98 - 112 mmol/L    CO2 26.0 21.0 - 32.0 mmol/L    Anion Gap 11 0 - 18 mmol/L    BUN 18 9 - 23 mg/dL    Creatinine 0.94 0.70 - 1.30 mg/dL    Calcium, Total 9.3 8.7 - 10.6 mg/dL    Calculated Osmolality 291 275 - 295 mOsm/kg    eGFR-Cr 96 >=60 mL/min/1.73m2    AST 23 <34 U/L    ALT 37 10 - 49 U/L    Alkaline Phosphatase 80 45 - 117 U/L    Bilirubin, Total 0.8 0.3 - 1.2 mg/dL    Total Protein 7.7 5.7 - 8.2 g/dL    Albumin 4.8 3.2 - 4.8 g/dL    Globulin  2.9 2.0 - 3.5 g/dL    A/G Ratio 1.7 1.0 - 2.0    Patient Fasting for CMP? Yes    Lipid Panel    Collection Time: 03/03/25 10:47 AM   Result Value Ref Range    Cholesterol, Total 154 <200 mg/dL    HDL Cholesterol 48 40 - 59 mg/dL    Triglycerides 99 30 - 149 mg/dL    LDL Cholesterol 88 <100 mg/dL    VLDL 16 0 - 30 mg/dL    Non HDL Chol 106 <130 mg/dL    Patient Fasting for Lipid? Yes    TSH W Reflex To Free T4    Collection Time: 03/03/25 10:47 AM   Result Value Ref Range    TSH 0.754 0.550 - 4.780 uIU/mL   UA/M with Culture Reflex    Collection Time: 03/03/25 12:45 PM    Specimen: Urine, clean catch   Result Value Ref Range    Urine Color Light-Yellow Yellow    Clarity Urine Turbid (A) Clear    Spec Gravity 1.011 1.005 - 1.030    Glucose Urine Normal Normal mg/dL    Bilirubin Urine Negative Negative    Ketones Urine Negative Negative mg/dL    Blood Urine Negative Negative    pH Urine 7.0 5.0 - 8.0    Protein Urine Negative Negative mg/dL    Urobilinogen Urine Normal Normal mg/dL    Nitrite Urine Negative Negative    Leukocyte Esterase Urine Negative Negative    WBC Urine 1-5 0 - 5 /HPF    RBC Urine 0-2 0 - 2 /HPF    Bacteria Urine Rare (A) None Seen /HPF    Squamous Epi. Cells Few (A) None Seen /HPF    Renal Tubular Epithelial Cells None Seen None Seen /HPF    Transitional Cells None Seen None Seen /HPF    Yeast Urine None Seen None Seen /HPF      ASSESSMENT AND PLAN:     Encounter  Diagnoses   Name Primary?    Essential hypertension Yes    Class 1 obesity due to excess calories without serious comorbidity with body mass index (BMI) of 32.0 to 32.9 in adult        Orders Placed This Encounter   Procedures    Comp Metabolic Panel (14)       Meds & Refills for this Visit:  Requested Prescriptions     Signed Prescriptions Disp Refills    lisinopril 10 MG Oral Tab 90 tablet 1     Sig: Take 1 tablet (10 mg total) by mouth daily.    lisinopril 5 MG Oral Tab 90 tablet 0     Sig: Take 1 tablet (5 mg total) by mouth daily.   Take lisinopril 10 mg tablet and 5 mg tablet at the same time.  Monitor blood pressure at home.  Consider getting new blood pressure machine.  Low-salt diet.  Check blood work for electrolytes few days prior next visit.  Schedule appointment in 1 month bring blood pressure readings for the visit.      VISIT SUMMARY:  Today, we discussed your recent elevated blood pressure readings. You have been on 10 mg of lisinopril daily for hypertension, but your recent readings suggest that this dosage may no longer be sufficient. We reviewed your home monitoring process and discussed potential inaccuracies with your current blood pressure monitor.    YOUR PLAN:  -HYPERTENSION: Hypertension, or high blood pressure, means that the force of the blood against your artery walls is too high, which can lead to health problems. We will increase your lisinopril dosage to 15 mg daily to better control your blood pressure. Additionally, you should obtain a reliable blood pressure monitor, preferably an Omron brand, and monitor your blood pressure regularly. If your blood pressure exceeds 140 mmHg, take an additional 10 mg of lisinopril and recheck it in 2 hours. We also discussed lifestyle changes such as reducing salt intake, increasing physical activity, and managing your weight to help control your blood pressure. Consider getting a heart scan for a more comprehensive cardiovascular  assessment.    INSTRUCTIONS:  Please schedule a follow-up appointment in one month or after your trip to [facility]. Bring your blood pressure readings to this appointment.    Contains text generated by [employer]     Imaging & Consults:  None    The patient indicates understanding of these issues and agrees to the plan.  The patient is asked to return in 1 month.         [1]   Current Outpatient Medications   Medication Sig Dispense Refill    lisinopril 10 MG Oral Tab Take 1 tablet (10 mg total) by mouth daily. 90 tablet 1    lisinopril 5 MG Oral Tab Take 1 tablet (5 mg total) by mouth daily. 90 tablet 0    Sildenafil Citrate 100 MG Oral Tab Take 1 tablet (100 mg total) by mouth daily as needed for Erectile Dysfunction. Take ~ one hour prior to sexual activity on an empty stomach 30 tablet 5   [2]   Past Medical History:   Arthritis    COVID    Hospitalized for 5 days. SOB/Fever    Hepatitis A    High blood pressure    LBP (low back pain)   [3]   Social History  Socioeconomic History    Marital status:    Tobacco Use    Smoking status: Never    Smokeless tobacco: Never   Vaping Use    Vaping status: Never Used   Substance and Sexual Activity    Alcohol use: Yes     Alcohol/week: 0.0 - 2.0 standard drinks of alcohol     Comment: weekends    Drug use: No    Sexual activity: Yes   Other Topics Concern    Caffeine Concern Yes    Exercise Yes     Comment: 2-3 xs per wk     Social Drivers of Health     Food Insecurity: No Food Insecurity (3/3/2025)    NCSS - Food Insecurity     Worried About Running Out of Food in the Last Year: No     Ran Out of Food in the Last Year: No   Transportation Needs: No Transportation Needs (3/3/2025)    NCSS - Transportation     Lack of Transportation: No   Housing Stability: Not At Risk (3/3/2025)    NCSS - Housing/Utilities     Has Housing: Yes     Worried About Losing Housing: No     Unable to Get Utilities: No

## 2025-05-18 ENCOUNTER — HOSPITAL ENCOUNTER (OUTPATIENT)
Dept: ULTRASOUND IMAGING | Age: 55
End: 2025-05-18
Attending: FAMILY MEDICINE
Payer: COMMERCIAL

## 2025-05-18 ENCOUNTER — HOSPITAL ENCOUNTER (OUTPATIENT)
Dept: ULTRASOUND IMAGING | Age: 55
Discharge: HOME | End: 2025-05-18
Attending: FAMILY MEDICINE
Payer: COMMERCIAL

## 2025-05-18 DIAGNOSIS — E04.1 THYROID NODULE: ICD-10-CM

## 2025-05-18 PROCEDURE — 76536 US EXAM OF HEAD AND NECK: CPT | Performed by: FAMILY MEDICINE

## 2025-08-02 DIAGNOSIS — I10 ESSENTIAL HYPERTENSION: Primary | ICD-10-CM

## 2025-08-04 RX ORDER — LISINOPRIL 5 MG/1
5 TABLET ORAL DAILY
Qty: 30 TABLET | Refills: 0 | Status: SHIPPED | OUTPATIENT
Start: 2025-08-04

## (undated) DEVICE — 1200CC GUARDIAN II: Brand: GUARDIAN

## (undated) DEVICE — ENDOSCOPY PACK - LOWER: Brand: MEDLINE INDUSTRIES, INC.

## (undated) DEVICE — FILTERLINE NASAL ADULT O2/CO2

## (undated) DEVICE — FORCEP BIOPSY RJ4 LG CAP W/ND

## (undated) DEVICE — 3M™ RED DOT™ MONITORING ELECTRODE WITH FOAM TAPE AND STICKY GEL, 50/BAG, 20/CASE, 72/PLT 2570: Brand: RED DOT™

## (undated) DEVICE — Device: Brand: DEFENDO AIR/WATER/SUCTION AND BIOPSY VALVE